# Patient Record
Sex: MALE | Race: WHITE | HISPANIC OR LATINO | Employment: UNEMPLOYED | URBAN - METROPOLITAN AREA
[De-identification: names, ages, dates, MRNs, and addresses within clinical notes are randomized per-mention and may not be internally consistent; named-entity substitution may affect disease eponyms.]

---

## 2017-05-23 ENCOUNTER — ALLSCRIPTS OFFICE VISIT (OUTPATIENT)
Dept: OTHER | Facility: OTHER | Age: 62
End: 2017-05-23

## 2017-10-03 ENCOUNTER — HOSPITAL ENCOUNTER (EMERGENCY)
Facility: HOSPITAL | Age: 62
Discharge: HOME | End: 2017-10-03
Attending: EMERGENCY MEDICINE
Payer: COMMERCIAL

## 2017-10-03 ENCOUNTER — APPOINTMENT (EMERGENCY)
Dept: RADIOLOGY | Facility: HOSPITAL | Age: 62
End: 2017-10-03
Payer: COMMERCIAL

## 2017-10-03 VITALS
SYSTOLIC BLOOD PRESSURE: 137 MMHG | OXYGEN SATURATION: 95 % | TEMPERATURE: 99.5 F | RESPIRATION RATE: 17 BRPM | DIASTOLIC BLOOD PRESSURE: 90 MMHG | HEART RATE: 74 BPM

## 2017-10-03 DIAGNOSIS — J32.9 SINUSITIS: Primary | ICD-10-CM

## 2017-10-03 DIAGNOSIS — R52 BODY ACHES: ICD-10-CM

## 2017-10-03 DIAGNOSIS — J45.901 ASTHMA EXACERBATION: ICD-10-CM

## 2017-10-03 LAB
ATRIAL RATE: 72 BPM
P AXIS: 71 DEGREES
PR INTERVAL: 150 MS
QRS AXIS: -5 DEGREES
QRSD INTERVAL: 78 MS
QT INTERVAL: 372 MS
QTC INTERVAL: 407 MS
T WAVE AXIS: 50 DEGREES
VENTRICULAR RATE: 72 BPM

## 2017-10-03 PROCEDURE — 96372 THER/PROPH/DIAG INJ SC/IM: CPT

## 2017-10-03 PROCEDURE — 94640 AIRWAY INHALATION TREATMENT: CPT

## 2017-10-03 PROCEDURE — 99284 EMERGENCY DEPT VISIT MOD MDM: CPT

## 2017-10-03 PROCEDURE — 93005 ELECTROCARDIOGRAM TRACING: CPT | Performed by: EMERGENCY MEDICINE

## 2017-10-03 PROCEDURE — 71020 HB CHEST X-RAY 2VW FRONTAL&LATL: CPT

## 2017-10-03 RX ORDER — PREDNISONE 20 MG/1
60 TABLET ORAL DAILY
Qty: 15 TABLET | Refills: 0 | Status: SHIPPED | OUTPATIENT
Start: 2017-10-03 | End: 2018-01-24 | Stop reason: ALTCHOICE

## 2017-10-03 RX ORDER — PREDNISONE 20 MG/1
60 TABLET ORAL ONCE
Status: COMPLETED | OUTPATIENT
Start: 2017-10-03 | End: 2017-10-03

## 2017-10-03 RX ORDER — OXYMETAZOLINE HYDROCHLORIDE 0.05 G/100ML
2 SPRAY NASAL 2 TIMES DAILY
Qty: 30 ML | Refills: 0 | Status: SHIPPED | OUTPATIENT
Start: 2017-10-03 | End: 2018-04-02

## 2017-10-03 RX ORDER — ALBUTEROL SULFATE 90 UG/1
2 AEROSOL, METERED RESPIRATORY (INHALATION) EVERY 4 HOURS PRN
Qty: 1 INHALER | Refills: 0 | Status: SHIPPED | OUTPATIENT
Start: 2017-10-03

## 2017-10-03 RX ORDER — KETOROLAC TROMETHAMINE 30 MG/ML
15 INJECTION, SOLUTION INTRAMUSCULAR; INTRAVENOUS ONCE
Status: COMPLETED | OUTPATIENT
Start: 2017-10-03 | End: 2017-10-03

## 2017-10-03 RX ADMIN — IPRATROPIUM BROMIDE 0.5 MG: 0.5 SOLUTION RESPIRATORY (INHALATION) at 12:33

## 2017-10-03 RX ADMIN — KETOROLAC TROMETHAMINE 15 MG: 30 INJECTION, SOLUTION INTRAMUSCULAR at 12:41

## 2017-10-03 RX ADMIN — PREDNISONE 60 MG: 20 TABLET ORAL at 12:36

## 2017-10-03 RX ADMIN — ALBUTEROL SULFATE 5 MG: 2.5 SOLUTION RESPIRATORY (INHALATION) at 12:33

## 2017-10-03 NOTE — DISCHARGE INSTRUCTIONS
Follow up with pcp in 1-3 days  If you have fevers, bloody cough, or if there are ANY concerns, return to the ED immediately  Asthma, Ambulatory Care   GENERAL INFORMATION:   Asthma  is a lung disease that makes breathing difficult  Chronic inflammation and reactions to triggers narrow the airways in your lungs  Asthma can become life-threatening if it is not managed  Common symptoms include the following:   · Coughing     · Wheezing     · Shortness of breath     · Chest tightness  Seek immediate care for the following symptoms:   · Severe shortness of breath    · Blue or gray lips or nails    · Skin around your neck and ribs pulls in with each breath    · Shortness of breath, even after you take your short-term medicine as directed     · Peak flow numbers in the red zone of your asthma action plan  Treatment for asthma  will depend on how severe it is  Medicine may decrease inflammation, open airways, and make it easier to breathe  Medicines may be inhaled, taken as a pill, or injected  Short-term medicines relieve your symptoms quickly  Long-term medicines are used to prevent future attacks  You may also need medicine to help control your allergies  Manage and prevent future asthma attacks:   · Follow your asthma action pan  This is a written plan that you and your healthcare provider create  It explains which medicine you need and when to change doses if necessary  It also explains how you can monitor symptoms and use a peak flow meter  The meter measures how well your lungs are working  · Manage other health conditions , such as allergies, acid reflux, and sleep apnea  · Identify and avoid triggers  These may include pets, dust mites, mold, and cockroaches  · Do not smoke and avoid others who smoke  If you smoke, it is never too late to quit  Ask your healthcare provider if you need help quitting  · Ask about a flu vaccine  The flu can make your asthma worse   You may need a yearly flu shot   Follow up with your healthcare provider as directed: You will need to return to make sure your medicine is working and your symptoms are controlled  You may be referred to an asthma or allergy specialist  Maria Fernanda Mendoza may be asked to keep a record of your peak flow values and bring it with you to your appointments  Write down your questions so you remember to ask them during your visits  CARE AGREEMENT:   You have the right to help plan your care  Learn about your health condition and how it may be treated  Discuss treatment options with your caregivers to decide what care you want to receive  You always have the right to refuse treatment  The above information is an  only  It is not intended as medical advice for individual conditions or treatments  Talk to your doctor, nurse or pharmacist before following any medical regimen to see if it is safe and effective for you  © 2014 [address] is for End User's use only and may not be sold, redistributed or otherwise used for commercial purposes  All illustrations and images included in CareNotes® are the copyrighted property of [employer]  or [employer]  Sinusitis   WHAT YOU NEED TO KNOW:   Sinusitis is inflammation or infection of your sinuses  It is most often caused by a virus  Acute sinusitis may last up to 12 weeks  Chronic sinusitis lasts longer than 12 weeks  Recurrent sinusitis means you have 4 or more times in 1 year  DISCHARGE INSTRUCTIONS:   Return to the emergency department if:   · Your eye and eyelid are red, swollen, and painful  · You cannot open your eye  · You have vision changes, such as double vision  · Your eyeball bulges out or you cannot move your eye  · You are more sleepy than normal, or you notice changes in your ability to think, move, or talk  · You have a stiff neck, a fever, or a bad headache  · You have swelling of your forehead or scalp    Contact your healthcare provider if:   · Your symptoms do not improve after 3 days  · Your symptoms do not go away after 10 days  · You have nausea and are vomiting  · Your nose is bleeding  · You have questions or concerns about your condition or care  Medicines: Your symptoms may go away on their own  Your healthcare provider may recommend watchful waiting for up to 10 days before starting antibiotics  You may  need any of the following:  · Acetaminophen  decreases pain and fever  It is available without a doctor's order  Ask how much to take and how often to take it  Follow directions  Read the labels of all other medicines you are using to see if they also contain acetaminophen, or ask your doctor or pharmacist  Acetaminophen can cause liver damage if not taken correctly  Do not use more than 4 grams (4,000 milligrams) total of acetaminophen in one day  · NSAIDs , such as ibuprofen, help decrease swelling, pain, and fever  This medicine is available with or without a doctor's order  NSAIDs can cause stomach bleeding or kidney problems in certain people  If you take blood thinner medicine, always ask your healthcare provider if NSAIDs are safe for you  Always read the medicine label and follow directions  · Nasal steroid sprays  may help decrease inflammation in your nose and sinuses  · Decongestants  help reduce swelling and drain mucus in the nose and sinuses  They may help you breathe easier  · Antihistamines  help dry mucus in the nose and relieve sneezing  · Antibiotics  help treat or prevent a bacterial infection  · Take your medicine as directed  Contact your healthcare provider if you think your medicine is not helping or if you have side effects  Tell him or her if you are allergic to any medicine  Keep a list of the medicines, vitamins, and herbs you take  Include the amounts, and when and why you take them  Bring the list or the pill bottles to follow-up visits   Carry your medicine list with you in case of an emergency  Self-care:   · Rinse your sinuses  Use a sinus rinse device to rinse your nasal passages with a saline (salt water) solution or distilled water  Do not use tap water  This will help thin the mucus in your nose and rinse away pollen and dirt  It will also help reduce swelling so you can breathe normally  Ask your healthcare provider how often to do this  · Breathe in steam   Heat a bowl of water until you see steam  Lean over the bowl and make a tent over your head with a large towel  Breathe deeply for about 20 minutes  Be careful not to get too close to the steam or burn yourself  Do this 3 times a day  You can also breathe deeply when you take a hot shower  · Sleep with your head elevated  Place an extra pillow under your head before you go to sleep to help your sinuses drain  · Drink liquids as directed  Ask your healthcare provider how much liquid to drink each day and which liquids are best for you  Liquids will thin the mucus in your nose and help it drain  Avoid drinks that contain alcohol or caffeine  · Do not smoke, and avoid secondhand smoke  Nicotine and other chemicals in cigarettes and cigars can make your symptoms worse  Ask your healthcare provider for information if you currently smoke and need help to quit  E-cigarettes or smokeless tobacco still contain nicotine  Talk to your healthcare provider before you use these products  Prevent the spread of germs that cause sinusitis:  Wash your hands often with soap and water  Wash your hands after you use the bathroom, change a child's diaper, or sneeze  Wash your hands before you prepare or eat food  Follow up with your healthcare provider as directed: You may be referred to an ear, nose, and throat specialist  Write down your questions so you remember to ask them during your visits     © 2017 [address] Information is for End User's use only and may not be sold, redistributed or otherwise used for commercial purposes  All illustrations and images included in CareNotes® are the copyrighted property of [employer]  or [address]  The above information is an  only  It is not intended as medical advice for individual conditions or treatments  Talk to your doctor, nurse or pharmacist before following any medical regimen to see if it is safe and effective for you

## 2017-10-03 NOTE — ED NOTES
ECG completed at 1232  Viewed and signed by Dr Terrance Eugene  Copy on chart         Denita Pierson  10/03/17 1311

## 2017-10-03 NOTE — ED PROVIDER NOTES
History  Chief Complaint   Patient presents with    URI     Pt states that when the weather changed last week he started with a cough and body aches  Pt states that his sinuses are "so bad "     78-year-old male history of asthma comes to emergency department for evaluation of shortness of breath  Patient states that for the past 5-7 days he has been having shortness of breath associated with body aches rhinorrhea and nonproductive cough  Patient states that this is likely due to the weather changes  Patient complains of exacerbation of his symptoms when he visited his aunt last Thursday who also had rhinorrhea and cough  Patient also states that he has nonradiating chest tightness that is worse when he coughs  Of note, patient states that the symptoms also got worse yesterday when he visited a sister who has cats to which he is allergic to  Patient states that he has been using his inhaler every to the cousin with mild relief of his symptoms  Patient denies history of PE or MRI  Otherwise, patient denies any fevers, nausea, vomiting, abdominal pain, dysuria, hematuria, constipation, diarrhea,  MDM: will give duonebs, prednisone ,cxr to evaluate for PNA and ibuprofen for body aches  I will reassess and dispo accordingly  None       Past Medical History:   Diagnosis Date    Asthma        Past Surgical History:   Procedure Laterality Date    HERNIA REPAIR         History reviewed  No pertinent family history  I have reviewed and agree with the history as documented  Social History   Substance Use Topics    Smoking status: Never Smoker    Smokeless tobacco: Never Used    Alcohol use Yes      Comment: occassionally        Review of Systems   Constitutional: Negative for appetite change, chills, diaphoresis, fatigue and fever  HENT: Negative for congestion, ear discharge, ear pain, hearing loss, postnasal drip, rhinorrhea, sneezing and sore throat      Eyes: Negative for pain, discharge and redness  Respiratory: Positive for cough, chest tightness, shortness of breath and wheezing  Negative for choking and stridor  Cardiovascular: Negative for chest pain and palpitations  Gastrointestinal: Negative for abdominal distention, abdominal pain, blood in stool, constipation, diarrhea, nausea and vomiting  Genitourinary: Negative for decreased urine volume, difficulty urinating, dysuria, flank pain, frequency and hematuria  Musculoskeletal: Negative for arthralgias, gait problem, joint swelling and neck pain  Skin: Negative for color change, pallor and rash  Allergic/Immunologic: Negative for environmental allergies, food allergies and immunocompromised state  Neurological: Negative for dizziness, seizures, weakness, light-headedness, numbness and headaches  Hematological: Negative for adenopathy  Does not bruise/bleed easily  Psychiatric/Behavioral: Negative for agitation and behavioral problems  Physical Exam  ED Triage Vitals [10/03/17 1203]   Temperature Pulse Respirations Blood Pressure SpO2   99 5 °F (37 5 °C) 91 20 155/99 95 %      Temp Source Heart Rate Source Patient Position - Orthostatic VS BP Location FiO2 (%)   Oral Monitor Sitting Right arm --      Pain Score       6           Physical Exam   Constitutional: He is oriented to person, place, and time  He appears well-developed and well-nourished  HENT:   Head: Normocephalic and atraumatic  Nose: Nose normal    Mouth/Throat: Oropharynx is clear and moist    Eyes: Conjunctivae and EOM are normal  Pupils are equal, round, and reactive to light  Neck: Normal range of motion  Neck supple  Cardiovascular: Normal rate, regular rhythm and normal heart sounds  Exam reveals no gallop and no friction rub  No murmur heard  Pulmonary/Chest: Effort normal  No respiratory distress  He has wheezes  He has no rales  Abdominal: Soft  Bowel sounds are normal  He exhibits no distension  There is no tenderness   There is no rebound and no guarding  Musculoskeletal: Normal range of motion  Neurological: He is alert and oriented to person, place, and time  Skin: Skin is warm and dry  Psychiatric: He has a normal mood and affect  His behavior is normal    Nursing note and vitals reviewed  ED Medications  Medications   albuterol inhalation solution 5 mg (5 mg Nebulization Given 10/3/17 1233)   ipratropium (ATROVENT) 0 02 % inhalation solution 0 5 mg (0 5 mg Nebulization Given 10/3/17 1233)   predniSONE tablet 60 mg (60 mg Oral Given 10/3/17 1236)   ketorolac (TORADOL) 30 mg/mL injection 15 mg (15 mg Intramuscular Given 10/3/17 1241)       Diagnostic Studies  Labs Reviewed - No data to display    XR chest 2 views   ED Interpretation   No sign of infiltrate, no  Pneumothorax  No acute pulmonary   pathology      Final Result      No active pulmonary disease  Workstation performed: [id number]             Procedures  Procedures      Phone Consults  ED Phone Contact    ED Course  ED Course as of Oct 03 2240   Tue Oct 03, 2017   1318 Patient is no longer wheezing  Patient no longer complains of chest tightness  Patient stated he feels much better            HEART Risk Score    Flowsheet Row Most Recent Value   History  0 Filed at: 10/03/2017 1219   ECG  No data   Age  1 Filed at: 10/03/2017 1219   Risk Factors  1 Filed at: 10/03/2017 1219   Troponin  No data   Heart Score Risk Calculator   History  0 Filed at: 10/03/2017 1219   ECG  No data   Age  1 Filed at: 10/03/2017 1219   Risk Factors  1 Filed at: 10/03/2017 1219   Troponin  No data   HEART Score  No data   HEART Score  No data                      Wells' Criteria for PE    Flowsheet Row Most Recent Value   Wells' Criteria for PE   Clinical signs and symptoms of DVT  0 Filed at: 10/03/2017 1219   PE is primary diagnosis or equally likely  0 Filed at: 10/03/2017 1219   HR >100  No data   Immobilization at least 3 days or Surgery in the previous 4 weeks  0 Filed at: 10/03/2017 1219   Previous, objectively diagnosed PE or DVT  0 Filed at: 10/03/2017 1219   Hemoptysis  0 Filed at: 10/03/2017 1219   Malignancy with treatment within 6 months or palliative  0 Filed at: 10/03/2017 1219   Wells' Criteria Total  0 Filed at: 10/03/2017 1219            [facility]  CritCare Time    Disposition  Final diagnoses:   Sinusitis   Asthma exacerbation   Body aches     ED Disposition     ED Disposition Condition Comment    Discharge  Alejo [place]  discharge to home/self care  Condition at discharge: Stable        Follow-up Information     Follow up With Specialties Details Why [address], DO Internal Medicine In 3 days  [address] [facility]  [phone]          Discharge Medication List as of 10/3/2017  1:28 PM      START taking these medications    Details   albuterol (PROVENTIL HFA,VENTOLIN HFA) 90 mcg/act inhaler Inhale 2 puffs every 4 (four) hours as needed for wheezing, Starting Tue 10/3/2017, Print      oxymetazoline (AFRIN) 0 05 % nasal spray 2 sprays by Each Nare route 2 (two) times a day, Starting Tue 10/3/2017, Print      predniSONE 20 mg tablet Take 3 tablets by mouth daily, Starting Tue 10/3/2017, Print           No discharge procedures on file  ED Provider  Attending physically available and evaluated [place] Sean PAREKH managed the patient along with the ED Attending      Electronically Signed by       Iman Miner MD  Resident  10/03/17 3042

## 2018-01-14 VITALS
BODY MASS INDEX: 35.35 KG/M2 | TEMPERATURE: 98 F | HEIGHT: 68 IN | WEIGHT: 233.25 LBS | SYSTOLIC BLOOD PRESSURE: 100 MMHG | DIASTOLIC BLOOD PRESSURE: 76 MMHG | HEART RATE: 76 BPM

## 2018-01-24 ENCOUNTER — OFFICE VISIT (OUTPATIENT)
Dept: INTERNAL MEDICINE CLINIC | Facility: CLINIC | Age: 63
End: 2018-01-24
Payer: COMMERCIAL

## 2018-01-24 VITALS
TEMPERATURE: 97.4 F | HEIGHT: 68 IN | DIASTOLIC BLOOD PRESSURE: 100 MMHG | HEART RATE: 62 BPM | BODY MASS INDEX: 36.49 KG/M2 | WEIGHT: 240.74 LBS | SYSTOLIC BLOOD PRESSURE: 150 MMHG

## 2018-01-24 DIAGNOSIS — Z00.00 HEALTHCARE MAINTENANCE: ICD-10-CM

## 2018-01-24 DIAGNOSIS — R53.83 FATIGUE: ICD-10-CM

## 2018-01-24 DIAGNOSIS — R73.01 IMPAIRED FASTING GLUCOSE: ICD-10-CM

## 2018-01-24 DIAGNOSIS — K40.90 RIGHT INGUINAL HERNIA: ICD-10-CM

## 2018-01-24 DIAGNOSIS — Z23 NEED FOR INFLUENZA VACCINATION: Primary | ICD-10-CM

## 2018-01-24 PROBLEM — E66.9 OBESITY: Status: ACTIVE | Noted: 2017-05-23

## 2018-01-24 PROBLEM — F32.A DEPRESSION: Status: ACTIVE | Noted: 2017-05-23

## 2018-01-24 PROCEDURE — 90688 IIV4 VACCINE SPLT 0.5 ML IM: CPT | Performed by: INTERNAL MEDICINE

## 2018-01-24 PROCEDURE — 99213 OFFICE O/P EST LOW 20 MIN: CPT | Performed by: INTERNAL MEDICINE

## 2018-01-24 PROCEDURE — 90471 IMMUNIZATION ADMIN: CPT | Performed by: INTERNAL MEDICINE

## 2018-01-24 RX ORDER — TRAZODONE HYDROCHLORIDE 50 MG/1
50 TABLET ORAL
Qty: 30 TABLET | Refills: 1 | Status: SHIPPED | OUTPATIENT
Start: 2018-01-24 | End: 2018-04-02

## 2018-01-24 RX ORDER — IBUPROFEN 400 MG/1
500 TABLET ORAL DAILY PRN
COMMUNITY
Start: 2015-07-06 | End: 2018-04-03 | Stop reason: HOSPADM

## 2018-01-24 NOTE — PROGRESS NOTES
Assessment/Plan:    1  Fatigue  · Likely secondary to depression, however will rule out anemia or hormonal abnormalities  · Will obtain CBC, BMP, Vitamin B12, Testosterone, TSH with T4 reflex  · Start Trazodone  Patient never filled his prescription of Sertraline  2  Health Maintenance  · Last Hb1AC 5 9% was 9/2014  Will obtain new Hb1AC  · Influenza Vaccine administered today  3  Right Inguinal Hernia  · S/p Repair  Patient states that he feels the mesh has fallen out of place  · Referral for General Surgery    4  BPH  · Continue Flomax  Problem List Items Addressed This Visit        Endocrine    Impaired fasting glucose    Relevant Orders    Hemoglobin A1c       Other    Fatigue    Relevant Medications    traZODone (DESYREL) 50 mg tablet    Other Relevant Orders    CBC and differential    Basic metabolic panel    Vitamin B12    TSH, 3rd generation with T4 reflex    Testosterone, Free/Tot Equilib    Healthcare maintenance    Relevant Orders    Basic metabolic panel    Need for influenza vaccination - Primary    Relevant Orders    Flu vaccine greater than or equal to 4yo preservative free IM      Other Visit Diagnoses     Right inguinal hernia        Relevant Orders    Ambulatory referral to General Surgery            Subjective:      Patient ID: Tonya Carlton  is a 60 y o  male  60year old male with pmhx of BPH, impaired fasting glucose, depression presented for 6 month follow up visit  Patient presents with fatigue and problems sleeping  Patient says he has problems sleeping at night, however he sits and watches TV until he falls asleep and drinks caffeinated tea before going to sleep   Patient has been attempting to exercise weekly with dietary modifications, however he has been gaining weight  He has gained approximately 4-6 pounds in 1 month  Patient also knows he's been having polyphagia and excessive hunger, but denies polyuria or polydipsia   Patient has decreased energy level however denies any feelings of guilt or impaired concentration  The following portions of the patient's history were reviewed and updated as appropriate:   He  has a past medical history of Asthma and Need for prophylactic vaccination and inoculation against influenza  He  does not have any pertinent problems on file  He  has a past surgical history that includes Hernia repair  His family history includes Breast cancer in his maternal aunt; No Known Problems in his family  He  reports that he has never smoked  He has never used smokeless tobacco  He reports that he drinks alcohol  He reports that he does not use drugs  Current Outpatient Prescriptions   Medication Sig Dispense Refill    albuterol (PROVENTIL HFA,VENTOLIN HFA) 90 mcg/act inhaler Inhale 2 puffs every 4 (four) hours as needed for wheezing 1 Inhaler 0    ibuprofen (MOTRIN) 400 mg tablet Take 1 tablet by mouth every 8 (eight) hours      oxymetazoline (AFRIN) 0 05 % nasal spray 2 sprays by Each Nare route 2 (two) times a day 30 mL 0    traZODone (DESYREL) 50 mg tablet Take 1 tablet by mouth daily at bedtime 30 tablet 1     No current facility-administered medications for this visit  Current Outpatient Prescriptions on File Prior to Visit   Medication Sig    albuterol (PROVENTIL HFA,VENTOLIN HFA) 90 mcg/act inhaler Inhale 2 puffs every 4 (four) hours as needed for wheezing    oxymetazoline (AFRIN) 0 05 % nasal spray 2 sprays by Each Nare route 2 (two) times a day    [DISCONTINUED] predniSONE 20 mg tablet Take 3 tablets by mouth daily     No current facility-administered medications on file prior to visit  He has No Known Allergies       Review of Systems   Constitutional: Positive for appetite change and fatigue  Negative for activity change, chills and fever  Eyes: Negative for itching and visual disturbance  Respiratory: Negative for cough, chest tightness and shortness of breath      Cardiovascular: Negative for chest pain and leg swelling  Gastrointestinal: Negative for abdominal pain, constipation, diarrhea, nausea and vomiting  Endocrine: Positive for polyphagia  Negative for cold intolerance and heat intolerance  Genitourinary: Positive for difficulty urinating  Negative for dysuria  Musculoskeletal: Negative for gait problem  Skin: Negative for rash  Allergic/Immunologic: Negative  Neurological: Negative for dizziness, weakness, light-headedness and headaches  Hematological: Negative  Psychiatric/Behavioral: Positive for sleep disturbance  Negative for agitation, decreased concentration and dysphoric mood  All other systems reviewed and are negative  Objective:  Vitals:    01/24/18 0857   BP: 150/100   Pulse: 62   Temp: (!) 97 4 °F (36 3 °C)        Physical Exam   Constitutional: He is oriented to person, place, and time  He appears well-developed and well-nourished  No distress  HENT:   Head: Normocephalic and atraumatic  Mouth/Throat: Oropharynx is clear and moist    Eyes: Conjunctivae are normal  Pupils are equal, round, and reactive to light  Neck: Normal range of motion  Neck supple  Cardiovascular: Normal rate, regular rhythm, normal heart sounds and intact distal pulses  Pulmonary/Chest: Effort normal and breath sounds normal  No respiratory distress  He has no wheezes  Abdominal: Soft  Bowel sounds are normal  He exhibits no distension  There is no tenderness  Obese abdomen   Musculoskeletal: He exhibits no edema  Lymphadenopathy:     He has no cervical adenopathy  Neurological: He is alert and oriented to person, place, and time  Skin: Skin is warm and dry  Psychiatric: He has a normal mood and affect  CHUCKY Rich    Internal Medicine PGY-1  [facility]  1/24/2018 11:59 AM

## 2018-01-24 NOTE — PATIENT INSTRUCTIONS
Please get lab test performed  Testosterone lab work needs to be drawn early in the morning between 8am-10am    Remember importance of sleep hygiene  Make an appointment with General Surgery for inguinal mesh repair  Follow up in 2-3 months or before if needed   your new medication Trazodone at your pharmacy

## 2018-01-25 ENCOUNTER — APPOINTMENT (OUTPATIENT)
Dept: LAB | Facility: CLINIC | Age: 63
End: 2018-01-25
Payer: COMMERCIAL

## 2018-01-25 DIAGNOSIS — R53.83 FATIGUE: ICD-10-CM

## 2018-01-25 DIAGNOSIS — Z00.00 HEALTHCARE MAINTENANCE: ICD-10-CM

## 2018-01-25 DIAGNOSIS — R73.01 IMPAIRED FASTING GLUCOSE: ICD-10-CM

## 2018-01-25 DIAGNOSIS — R79.89 LOW TESTOSTERONE LEVEL IN MALE: Primary | ICD-10-CM

## 2018-01-25 LAB
ANION GAP SERPL CALCULATED.3IONS-SCNC: 6 MMOL/L (ref 4–13)
BASOPHILS # BLD AUTO: 0.03 THOUSANDS/ΜL (ref 0–0.1)
BASOPHILS NFR BLD AUTO: 0 % (ref 0–1)
BUN SERPL-MCNC: 18 MG/DL (ref 5–25)
CALCIUM SERPL-MCNC: 8.6 MG/DL (ref 8.3–10.1)
CHLORIDE SERPL-SCNC: 109 MMOL/L (ref 100–108)
CO2 SERPL-SCNC: 27 MMOL/L (ref 21–32)
CREAT SERPL-MCNC: 1.1 MG/DL (ref 0.6–1.3)
EOSINOPHIL # BLD AUTO: 0.3 THOUSAND/ΜL (ref 0–0.61)
EOSINOPHIL NFR BLD AUTO: 4 % (ref 0–6)
ERYTHROCYTE [DISTWIDTH] IN BLOOD BY AUTOMATED COUNT: 14.3 % (ref 11.6–15.1)
EST. AVERAGE GLUCOSE BLD GHB EST-MCNC: 123 MG/DL
GFR SERPL CREATININE-BSD FRML MDRD: 71 ML/MIN/1.73SQ M
GLUCOSE P FAST SERPL-MCNC: 103 MG/DL (ref 65–99)
HBA1C MFR BLD: 5.9 % (ref 4.2–6.3)
HCT VFR BLD AUTO: 43.9 % (ref 36.5–49.3)
HGB BLD-MCNC: 14.8 G/DL (ref 12–17)
LYMPHOCYTES # BLD AUTO: 2.32 THOUSANDS/ΜL (ref 0.6–4.47)
LYMPHOCYTES NFR BLD AUTO: 28 % (ref 14–44)
MCH RBC QN AUTO: 31.6 PG (ref 26.8–34.3)
MCHC RBC AUTO-ENTMCNC: 33.7 G/DL (ref 31.4–37.4)
MCV RBC AUTO: 94 FL (ref 82–98)
MONOCYTES # BLD AUTO: 0.87 THOUSAND/ΜL (ref 0.17–1.22)
MONOCYTES NFR BLD AUTO: 10 % (ref 4–12)
NEUTROPHILS # BLD AUTO: 4.83 THOUSANDS/ΜL (ref 1.85–7.62)
NEUTS SEG NFR BLD AUTO: 58 % (ref 43–75)
NRBC BLD AUTO-RTO: 0 /100 WBCS
PLATELET # BLD AUTO: 312 THOUSANDS/UL (ref 149–390)
PMV BLD AUTO: 10.8 FL (ref 8.9–12.7)
POTASSIUM SERPL-SCNC: 3.8 MMOL/L (ref 3.5–5.3)
RBC # BLD AUTO: 4.68 MILLION/UL (ref 3.88–5.62)
SODIUM SERPL-SCNC: 142 MMOL/L (ref 136–145)
TSH SERPL DL<=0.05 MIU/L-ACNC: 2.69 UIU/ML (ref 0.36–3.74)
VIT B12 SERPL-MCNC: 350 PG/ML (ref 100–900)
WBC # BLD AUTO: 8.39 THOUSAND/UL (ref 4.31–10.16)

## 2018-01-25 PROCEDURE — 83036 HEMOGLOBIN GLYCOSYLATED A1C: CPT

## 2018-01-25 PROCEDURE — 84443 ASSAY THYROID STIM HORMONE: CPT

## 2018-01-25 PROCEDURE — 36415 COLL VENOUS BLD VENIPUNCTURE: CPT

## 2018-01-25 PROCEDURE — 82607 VITAMIN B-12: CPT

## 2018-01-25 PROCEDURE — 84403 ASSAY OF TOTAL TESTOSTERONE: CPT

## 2018-01-25 PROCEDURE — 80048 BASIC METABOLIC PNL TOTAL CA: CPT

## 2018-01-25 PROCEDURE — 84402 ASSAY OF FREE TESTOSTERONE: CPT

## 2018-01-25 PROCEDURE — 85025 COMPLETE CBC W/AUTO DIFF WBC: CPT

## 2018-01-27 LAB
TESTOST FREE SERPL-MCNC: 9.8 PG/ML (ref 6.6–18.1)
TESTOST SERPL-MCNC: 141 NG/DL (ref 264–916)

## 2018-02-13 ENCOUNTER — APPOINTMENT (OUTPATIENT)
Dept: LAB | Facility: CLINIC | Age: 63
End: 2018-02-13
Payer: COMMERCIAL

## 2018-02-13 DIAGNOSIS — R53.83 FATIGUE: ICD-10-CM

## 2018-02-13 DIAGNOSIS — R79.89 LOW TESTOSTERONE LEVEL IN MALE: ICD-10-CM

## 2018-02-13 LAB
FSH SERPL-ACNC: 3 MIU/ML (ref 0.7–10.8)
LH SERPL-ACNC: 3.2 MIU/ML (ref 1.2–10.6)
PROLACTIN SERPL-MCNC: 16.7 NG/ML (ref 2.5–17.4)

## 2018-02-13 PROCEDURE — 83001 ASSAY OF GONADOTROPIN (FSH): CPT

## 2018-02-13 PROCEDURE — 84403 ASSAY OF TOTAL TESTOSTERONE: CPT

## 2018-02-13 PROCEDURE — 83918 ORGANIC ACIDS TOTAL QUANT: CPT

## 2018-02-13 PROCEDURE — 84402 ASSAY OF FREE TESTOSTERONE: CPT

## 2018-02-13 PROCEDURE — 83002 ASSAY OF GONADOTROPIN (LH): CPT

## 2018-02-13 PROCEDURE — 36415 COLL VENOUS BLD VENIPUNCTURE: CPT

## 2018-02-13 PROCEDURE — 84146 ASSAY OF PROLACTIN: CPT

## 2018-02-14 LAB
TESTOST FREE SERPL-MCNC: 10.3 PG/ML (ref 6.6–18.1)
TESTOST SERPL-MCNC: 171 NG/DL (ref 264–916)

## 2018-02-16 DIAGNOSIS — R79.89 LOW TESTOSTERONE LEVEL IN MALE: ICD-10-CM

## 2018-02-16 LAB — METHYLMALONATE SERPL-SCNC: 179 NMOL/L (ref 0–378)

## 2018-02-16 RX ORDER — TESTOSTERONE 12.5 MG/1.25G
50 GEL TOPICAL EVERY MORNING
Qty: 1 BOTTLE | Refills: 0 | Status: SHIPPED | OUTPATIENT
Start: 2018-02-16 | End: 2018-04-02

## 2018-02-16 RX ORDER — TESTOSTERONE 12.5 MG/1.25G
50 GEL TOPICAL EVERY MORNING
Qty: 1 BOTTLE | Refills: 0 | Status: SHIPPED | OUTPATIENT
Start: 2018-02-16 | End: 2018-02-16 | Stop reason: SDUPTHER

## 2018-02-16 RX ORDER — TESTOSTERONE 12.5 MG/1.25G
50 GEL TOPICAL EVERY MORNING
Qty: 1 BOTTLE | Refills: 1 | OUTPATIENT
Start: 2018-02-16 | End: 2018-02-16 | Stop reason: SDUPTHER

## 2018-02-16 NOTE — TELEPHONE ENCOUNTER
SCRIPT PRINTED AND ATTEMPTED TO CONTACT Pt   NO ANSWER BUT WE WILL CONTINUE TO TRY AS WE NEED TO GIVE HIM LAB RESULTS AS WELL

## 2018-02-26 ENCOUNTER — OFFICE VISIT (OUTPATIENT)
Dept: MULTI SPECIALTY CLINIC | Facility: CLINIC | Age: 63
End: 2018-02-26
Payer: COMMERCIAL

## 2018-02-26 VITALS
WEIGHT: 241.18 LBS | BODY MASS INDEX: 36.55 KG/M2 | SYSTOLIC BLOOD PRESSURE: 138 MMHG | TEMPERATURE: 98.1 F | HEIGHT: 68 IN | HEART RATE: 68 BPM | DIASTOLIC BLOOD PRESSURE: 82 MMHG

## 2018-02-26 DIAGNOSIS — K40.90 RIGHT INGUINAL HERNIA: ICD-10-CM

## 2018-02-26 PROCEDURE — 99202 OFFICE O/P NEW SF 15 MIN: CPT | Performed by: SURGERY

## 2018-02-26 NOTE — ASSESSMENT & PLAN NOTE
No hernia palpated on physical exam  Will get ultrasound to reassess for hernia recurrence  If no hernia seen on ultrasound, will assess with CT scan  Follow up with us in the office after ultrasound

## 2018-02-26 NOTE — PROGRESS NOTES
Office Visit - General Surgery  Alejoabby Torres  MRN: [id number]  Encounter: [id number]    Assessment and Plan    Problem List Items Addressed This Visit        Other    Right inguinal hernia     No hernia palpated on physical exam  Will get ultrasound to reassess for hernia recurrence  If no hernia seen on ultrasound, will assess with CT scan  Follow up with us in the office after ultrasound  Relevant Orders    US groin/inguinal area          Chief Complaint:  Gypsy Palacios  is a 60 y o  male who presents for Inguinal Hernia (Right)    Subjective  62M who presents with what he thinks is a recurrence of his right inguinal hernia  He states that he had the repair done here at [place] [address] as an open repair with mesh approximately 5-10 years ago  He had an ultrasound performed in 2014 which was negative for any hernias  He states that is does not cause him very much pain, but it does bulge approximately 3 times a week when he is very relaxed or after he has just exercised  He says it feels like the hernia before it was repaired  He does not feel a bulge in his scrotum  He says it has mostly been bothering him when he urinates  He has difficulty starting his urinary stream and feels as though he needs to press on the right inguinal area in order to urinate  He does have a history of BPH for which he takes flomax  He does not have any changes in his bowel habits and has been moving his bowels appropriately  He has been tolerating a diet with no issues  He denies abdominal pain       Past Medical History  Past Medical History:   Diagnosis Date    Asthma     Need for prophylactic vaccination and inoculation against influenza        Past Surgical History  Past Surgical History:   Procedure Laterality Date    HERNIA REPAIR         Family History  Family History   Problem Relation Age of Onset    No Known Problems Family     Breast cancer Maternal Aunt        Medications  Current Outpatient Prescriptions on File Prior to Visit   Medication Sig Dispense Refill    albuterol (PROVENTIL HFA,VENTOLIN HFA) 90 mcg/act inhaler Inhale 2 puffs every 4 (four) hours as needed for wheezing 1 Inhaler 0    ibuprofen (MOTRIN) 400 mg tablet Take 1 tablet by mouth every 8 (eight) hours      oxymetazoline (AFRIN) 0 05 % nasal spray 2 sprays by Each Nare route 2 (two) times a day 30 mL 0    Testosterone 12 5 MG/ACT (1%) GEL Place 50 mg on the skin every morning Apply to shoulders, upper arms or abdomen (not to genitals) each morning  1 Bottle 0    traZODone (DESYREL) 50 mg tablet Take 1 tablet by mouth daily at bedtime 30 tablet 1     No current facility-administered medications on file prior to visit  Allergies  No Known Allergies    Review of Systems   Constitutional: Negative  HENT: Negative  Eyes: Negative  Respiratory: Negative  Cardiovascular: Negative  Gastrointestinal: Negative  Negative for abdominal pain, blood in stool, constipation, diarrhea, nausea and vomiting  Endocrine: Negative  Genitourinary: Positive for difficulty urinating  Groin swelling/bulge   Musculoskeletal: Negative  Skin: Negative  Allergic/Immunologic: Negative  Neurological: Negative  Hematological: Negative  Psychiatric/Behavioral: Negative  Objective  Vitals:    02/26/18 1004   BP: 138/82   Pulse: 68   Temp: 98 1 °F (36 7 °C)       Physical Exam   Constitutional: He is oriented to person, place, and time  He appears well-developed and well-nourished  No distress  HENT:   Head: Normocephalic and atraumatic  Eyes: EOM are normal  Pupils are equal, round, and reactive to light  No scleral icterus  Neck: No JVD present  Cardiovascular: Normal rate, regular rhythm and normal heart sounds  Exam reveals no gallop and no friction rub  No murmur heard  Pulmonary/Chest: Effort normal and breath sounds normal  No stridor  No respiratory distress  Abdominal: Soft  He exhibits no distension  There is no tenderness  There is no rebound and no guarding  Genitourinary:   Genitourinary Comments: No inguinal hernia on right noted during exam even with valsalva  Musculoskeletal: He exhibits no edema  Neurological: He is alert and oriented to person, place, and time  Skin: Skin is warm and dry  He is not diaphoretic  Psychiatric: He has a normal mood and affect

## 2018-03-01 ENCOUNTER — TRANSCRIBE ORDERS (OUTPATIENT)
Dept: RADIOLOGY | Facility: HOSPITAL | Age: 63
End: 2018-03-01

## 2018-03-01 ENCOUNTER — HOSPITAL ENCOUNTER (OUTPATIENT)
Dept: RADIOLOGY | Facility: HOSPITAL | Age: 63
Discharge: HOME | End: 2018-03-01
Payer: COMMERCIAL

## 2018-03-01 DIAGNOSIS — K40.90 RIGHT INGUINAL HERNIA: ICD-10-CM

## 2018-03-01 PROCEDURE — 76705 ECHO EXAM OF ABDOMEN: CPT

## 2018-03-26 ENCOUNTER — OFFICE VISIT (OUTPATIENT)
Dept: MULTI SPECIALTY CLINIC | Facility: CLINIC | Age: 63
End: 2018-03-26
Payer: COMMERCIAL

## 2018-03-26 VITALS
TEMPERATURE: 97.2 F | DIASTOLIC BLOOD PRESSURE: 90 MMHG | SYSTOLIC BLOOD PRESSURE: 144 MMHG | HEART RATE: 72 BPM | BODY MASS INDEX: 36.09 KG/M2 | WEIGHT: 238.1 LBS | HEIGHT: 68 IN

## 2018-03-26 DIAGNOSIS — K40.90 RIGHT INGUINAL HERNIA: Primary | ICD-10-CM

## 2018-03-26 PROCEDURE — 99213 OFFICE O/P EST LOW 20 MIN: CPT | Performed by: SURGERY

## 2018-03-26 NOTE — PROGRESS NOTES
Office Visit - General Surgery  Alejo Delarosa  MRN: [id number]  Encounter: [id number]    Assessment and Plan    Problem List Items Addressed This Visit        Other    Right inguinal hernia - Primary     Large right inguinal hernia w/ contents extending to scrotum, reducible on exam    I have recommended open repair; patient with previous repair on LEFT side, there is no evidence of recurrence at his previous repair site  Discussed risks, benefits of repair  Patient is agreeable to above  Will obtain CXR and EKG for history of asthma  We will schedule surgery at earliest mutual convenience               Chief Complaint:  Shilpa Isidro  is a 60 y o  male who presents for Follow-up (Ultrasound results)    Subjective  60 y o  M w/ symptomatic R inguinal hernia  Patient has previous L sided open repair  He denies obstructive symptoms, however occasionally has issues urinating  He has had previous left sided repair  He presents for evaluation       U/S confirms right sided inguinal hernia containing bladder    Past Medical History  Past Medical History:   Diagnosis Date    Asthma     Need for prophylactic vaccination and inoculation against influenza        Past Surgical History  Past Surgical History:   Procedure Laterality Date    HERNIA REPAIR         Family History  Family History   Problem Relation Age of Onset    No Known Problems Family     Breast cancer Maternal Aunt        Medications  Current Outpatient Prescriptions on File Prior to Visit   Medication Sig Dispense Refill    albuterol (PROVENTIL HFA,VENTOLIN HFA) 90 mcg/act inhaler Inhale 2 puffs every 4 (four) hours as needed for wheezing 1 Inhaler 0    ibuprofen (MOTRIN) 400 mg tablet Take 1 tablet by mouth every 8 (eight) hours      oxymetazoline (AFRIN) 0 05 % nasal spray 2 sprays by Each Nare route 2 (two) times a day 30 mL 0    Testosterone 12 5 MG/ACT (1%) GEL Place 50 mg on the skin every morning Apply to shoulders, upper arms or abdomen (not to genitals) each morning  1 Bottle 0    traZODone (DESYREL) 50 mg tablet Take 1 tablet by mouth daily at bedtime 30 tablet 1     No current facility-administered medications on file prior to visit  Allergies  No Known Allergies    Review of Systems   Constitutional: Negative  HENT: Negative  Eyes: Negative  Respiratory: Negative  Cardiovascular: Negative  Gastrointestinal: Negative for abdominal distention, nausea and vomiting  Endocrine: Negative  Genitourinary: Positive for difficulty urinating, scrotal swelling and testicular pain  Musculoskeletal: Negative  Skin: Negative  Allergic/Immunologic: Negative  Neurological: Negative  Hematological: Negative  Psychiatric/Behavioral: Negative  Objective  Vitals:    03/26/18 0935   BP: 144/90   Pulse: 72   Temp: (!) 97 2 °F (36 2 °C)       Physical Exam   Constitutional: He appears well-developed  No distress  Eyes: Pupils are equal, round, and reactive to light  Neck: Normal range of motion  Cardiovascular: Normal rate and regular rhythm  Pulmonary/Chest: Effort normal and breath sounds normal  No respiratory distress  Abdominal: Soft  He exhibits no distension  There is no tenderness     Right inguinal hernia, reducible when supine with hernia contents extending to right scrotum, no palapble mass on left side at site of previous open repair, incision is healed

## 2018-03-26 NOTE — ASSESSMENT & PLAN NOTE
Large right inguinal hernia w/ contents extending to scrotum, reducible on exam    I have recommended open repair; patient with previous repair on LEFT side, there is no evidence of recurrence at his previous repair site  Discussed risks, benefits of repair  Patient is agreeable to above  Will obtain CXR and EKG for history of asthma   We will schedule surgery at earliest mutual convenience

## 2018-03-26 NOTE — PATIENT INSTRUCTIONS
Inguinal Hernia   WHAT YOU NEED TO KNOW:   What is an inguinal hernia? An inguinal hernia happens when organs or abdominal tissue push through a weak spot in the abdominal wall  The abdominal wall is made of fat and muscle  It holds the intestines in place  The hernia may contain fluid, tissue from the abdomen, or part of an organ (such as an intestine)  What causes an inguinal hernia? The cause of your hernia may not be known  You may have been born with a weak spot or opening in the abdominal wall  The area may have become weak from surgery or an injury  You may get a hernia after you lift something heavy or strain during a bowel movement  Your risk for a hernia may be increased if you smoke or you are overweight  Inguinal hernias are more common in males  A family history of hernias increases your risk for an inguinal hernia  What are the signs and symptoms of an inguinal hernia? A hernia may happen over time or it may happen suddenly  Some movements can make symptoms worse  Examples include when you cough, sneeze, strain to have a bowel movement, lift, or stand for a long time  You may have any of the following:  · A soft lump or bulge in your groin, lower abdomen, or scrotum     · Pain or burning in your abdomen  How is an inguinal hernia diagnosed? Your healthcare provider may ask you to bend or cough to see if he can feel your hernia  You may need blood or urine tests to check your kidney function or find signs of infection  X-ray, MRI, CT scan, or ultrasound pictures may show blockage in the intestines or lack of blood flow to organs  You may be given contrast liquid to help the organs show up better in the pictures  Tell the healthcare provider if you have ever had an allergic reaction to contrast liquid  Do not enter the MRI room with anything metal  Metal can cause serious injury  Tell the healthcare provider if you have any metal in or on your body  How is an inguinal hernia treated?    · A manual reduction of the hernia  may be needed  Manual reduction means your healthcare provider will use his hands to put firm, steady pressure on your hernia  He will continue until the hernia disappears inside the abdominal wall  · Surgery  may be needed if the hernia stops blood flow to any of the organs  Surgery may also be needed if the hernia causes a hole in the intestines, or blocks the intestines  How can I manage my symptoms and prevent another hernia? · Do not lift anything heavy  Heavy lifting can make your hernia worse or cause another hernia  Ask your healthcare provider how much is safe for you to lift  · Drink liquids as directed  Liquids may prevent constipation and straining during a bowel movement  Ask how much liquid to drink each day and which liquids are best for you  · Eat foods high in fiber  Fiber may prevent constipation and straining during a bowel movement  Foods that contain fiber include fruits, vegetables, beans, lentils, and whole grains  · Maintain a healthy weight  If you are overweight, weight loss may prevent your hernia from getting worse  It may also prevent another hernia  Talk to your healthcare provider about exercise and how to lose weight safely if you are overweight  · Do not smoke  Nicotine and other chemicals in cigarettes and cigars can weaken the abdominal wall  This may increase your risk for another hernia  Ask your healthcare provider for information if you currently smoke and need help to quit  E-cigarettes or smokeless tobacco still contain nicotine  Talk to your healthcare provider before you use these products  · Take NSAIDs as directed  NSAIDs, such as ibuprofen, help decrease swelling, pain, and fever  NSAIDs can cause stomach bleeding or kidney problems in certain people  If you take blood thinner medicine, always ask your healthcare provider if NSAIDs are safe for you   Always read the medicine label and follow directions  When should I seek immediate care? · You have severe abdominal pain with nausea and vomiting  · Your abdomen is larger than usual      · Your hernia gets bigger or is purple or blue  · You see blood in your bowel movements  · You feel weak, dizzy, or faint  When should I contact my healthcare provider? · You have a fever  · You have questions or concerns about your condition or care  CARE AGREEMENT:   You have the right to help plan your care  Learn about your health condition and how it may be treated  Discuss treatment options with your caregivers to decide what care you want to receive  You always have the right to refuse treatment  The above information is an  only  It is not intended as medical advice for individual conditions or treatments  Talk to your doctor, nurse or pharmacist before following any medical regimen to see if it is safe and effective for you  © 2017 [employer] Information is for End User's use only and may not be sold, redistributed or otherwise used for commercial purposes  All illustrations and images included in CareNotes® are the copyrighted property of [employer]  or [employer]

## 2018-03-28 ENCOUNTER — TELEPHONE (OUTPATIENT)
Dept: INTERNAL MEDICINE CLINIC | Facility: CLINIC | Age: 63
End: 2018-03-28

## 2018-03-28 NOTE — TELEPHONE ENCOUNTER
Tried calling pt to provide surgery date, but number on file [phone] kept ringing and did not have a voicemail  Will try again at a later time

## 2018-03-29 ENCOUNTER — TELEPHONE (OUTPATIENT)
Dept: INTERNAL MEDICINE CLINIC | Facility: CLINIC | Age: 63
End: 2018-03-29

## 2018-03-29 NOTE — TELEPHONE ENCOUNTER
Spoke to pt and confirmed surgery date 4/3/2018 with Dr Law Yee for Right Inguinal Hernia Repair at [facility] [place]  Pt is aware the hospital will call the day before surgery to provide time  Pt is aware a chest Xray and EKG are required before surgery  Pt advised he will go tomorrow to the hospital as an outpatient to have testing completed

## 2018-03-29 NOTE — TELEPHONE ENCOUNTER
Pt is scheduled for surgery on 4/3/2018 with Dr Peg Oconnell for Right Inguinal Hernia Repair at [facility]  P code: [id number], D code: [id number] [phone]

## 2018-03-30 ENCOUNTER — OFFICE VISIT (OUTPATIENT)
Dept: LAB | Facility: HOSPITAL | Age: 63
End: 2018-03-30
Attending: SURGERY
Payer: COMMERCIAL

## 2018-03-30 ENCOUNTER — TRANSCRIBE ORDERS (OUTPATIENT)
Dept: LAB | Facility: HOSPITAL | Age: 63
End: 2018-03-30

## 2018-03-30 ENCOUNTER — HOSPITAL ENCOUNTER (OUTPATIENT)
Dept: RADIOLOGY | Facility: HOSPITAL | Age: 63
Discharge: HOME | End: 2018-03-30
Attending: SURGERY
Payer: COMMERCIAL

## 2018-03-30 DIAGNOSIS — K40.90 RIGHT INGUINAL HERNIA: ICD-10-CM

## 2018-03-30 LAB
ATRIAL RATE: 65 BPM
P AXIS: 38 DEGREES
PR INTERVAL: 158 MS
QRS AXIS: -38 DEGREES
QRSD INTERVAL: 82 MS
QT INTERVAL: 410 MS
QTC INTERVAL: 426 MS
T WAVE AXIS: 14 DEGREES
VENTRICULAR RATE: 65 BPM

## 2018-03-30 PROCEDURE — 71046 X-RAY EXAM CHEST 2 VIEWS: CPT

## 2018-03-30 PROCEDURE — 93005 ELECTROCARDIOGRAM TRACING: CPT

## 2018-04-02 ENCOUNTER — ANESTHESIA EVENT (OUTPATIENT)
Dept: PERIOP | Facility: HOSPITAL | Age: 63
End: 2018-04-02
Payer: COMMERCIAL

## 2018-04-02 NOTE — ANESTHESIA PREPROCEDURE EVALUATION
Review of Systems/Medical History  Patient summary reviewed  Chart reviewed  No history of anesthetic complications     Cardiovascular  Negative cardio ROS    Pulmonary  Asthma: PRN med  controlled Asthma type of rescue: PRN inhaler,        GI/Hepatic  Negative GI/hepatic ROS     Comment: RIH     Negative  ROS Prostatic disorder, benign prostatic hyperplasia       Endo/Other    Obesity (BMI 36)  morbid obesity   GYN       Hematology  Negative hematology ROS      Musculoskeletal    Comment: Right knee pain      Neurology  Negative neurology ROS      Psychology   Depression ,              Physical Exam    Airway    Mallampati score: I  TM Distance: >3 FB  Neck ROM: full     Dental   No notable dental hx     Cardiovascular  Comment: Negative ROS, Rhythm: regular,     Pulmonary  Breath sounds clear to auscultation,     Other Findings        Anesthesia Plan  ASA Score- 2     Anesthesia Type- general with ASA Monitors  Additional Monitors:   Airway Plan: ETT  Plan Factors-    Induction- intravenous  Postoperative Plan- Plan for postoperative opioid use  Planned trial extubation    Informed Consent- Anesthetic plan and risks discussed with patient  I personally reviewed this patient with the CRNA  Discussed and agreed on the Anesthesia Plan with the CRNA  Rosella Goldmann

## 2018-04-02 NOTE — TELEPHONE ENCOUNTER
No auth required for this surgery   Spoke to insurance Rep  At [address] [place] [address]  04/02/18 12:50p   I will complete all referral process  Thanks!

## 2018-04-02 NOTE — PRE-PROCEDURE INSTRUCTIONS
Pre-Surgery Instructions:   Medication Instructions    albuterol (PROVENTIL HFA,VENTOLIN HFA) 90 mcg/act inhaler Instructed patient per Anesthesia Guidelines   ibuprofen (MOTRIN) 400 mg tablet Instructed patient per Anesthesia Guidelines  REVIEWED  PRINTED SURGICAL INSTRUCTIONS WITH PATIENT , PATIENT VERBALIZED UNDERSTANDING   MEDICATIONS REVIEWED  Will bring inhaler dos

## 2018-04-03 ENCOUNTER — ANESTHESIA (OUTPATIENT)
Dept: PERIOP | Facility: HOSPITAL | Age: 63
End: 2018-04-03
Payer: COMMERCIAL

## 2018-04-03 ENCOUNTER — HOSPITAL ENCOUNTER (OUTPATIENT)
Facility: HOSPITAL | Age: 63
Setting detail: OUTPATIENT SURGERY
Discharge: HOME | End: 2018-04-03
Attending: SURGERY | Admitting: SURGERY
Payer: COMMERCIAL

## 2018-04-03 VITALS
RESPIRATION RATE: 16 BRPM | TEMPERATURE: 98.9 F | WEIGHT: 238 LBS | HEIGHT: 68 IN | OXYGEN SATURATION: 97 % | DIASTOLIC BLOOD PRESSURE: 75 MMHG | SYSTOLIC BLOOD PRESSURE: 139 MMHG | BODY MASS INDEX: 36.07 KG/M2 | HEART RATE: 78 BPM

## 2018-04-03 DIAGNOSIS — K40.90 RIGHT INGUINAL HERNIA: Primary | ICD-10-CM

## 2018-04-03 PROCEDURE — 49505 PRP I/HERN INIT REDUC >5 YR: CPT | Performed by: SURGERY

## 2018-04-03 PROCEDURE — C1781 MESH (IMPLANTABLE): HCPCS | Performed by: SURGERY

## 2018-04-03 DEVICE — BARD MESH
Type: IMPLANTABLE DEVICE | Site: INGUINAL | Status: FUNCTIONAL
Brand: BARD MESH

## 2018-04-03 RX ORDER — FENTANYL CITRATE/PF 50 MCG/ML
50 SYRINGE (ML) INJECTION
Status: DISCONTINUED | OUTPATIENT
Start: 2018-04-03 | End: 2018-04-03 | Stop reason: HOSPADM

## 2018-04-03 RX ORDER — LIDOCAINE HYDROCHLORIDE 10 MG/ML
INJECTION, SOLUTION INFILTRATION; PERINEURAL AS NEEDED
Status: DISCONTINUED | OUTPATIENT
Start: 2018-04-03 | End: 2018-04-03 | Stop reason: SURG

## 2018-04-03 RX ORDER — ONDANSETRON 2 MG/ML
4 INJECTION INTRAMUSCULAR; INTRAVENOUS ONCE AS NEEDED
Status: DISCONTINUED | OUTPATIENT
Start: 2018-04-03 | End: 2018-04-03 | Stop reason: HOSPADM

## 2018-04-03 RX ORDER — TRAMADOL HYDROCHLORIDE 50 MG/1
50 TABLET ORAL EVERY 6 HOURS PRN
Status: DISCONTINUED | OUTPATIENT
Start: 2018-04-03 | End: 2018-04-03 | Stop reason: HOSPADM

## 2018-04-03 RX ORDER — TRAMADOL HYDROCHLORIDE 50 MG/1
50 TABLET ORAL EVERY 6 HOURS PRN
Qty: 20 TABLET | Refills: 0 | Status: SHIPPED | OUTPATIENT
Start: 2018-04-03 | End: 2018-10-01 | Stop reason: ALTCHOICE

## 2018-04-03 RX ORDER — SODIUM CHLORIDE, SODIUM LACTATE, POTASSIUM CHLORIDE, CALCIUM CHLORIDE 600; 310; 30; 20 MG/100ML; MG/100ML; MG/100ML; MG/100ML
50 INJECTION, SOLUTION INTRAVENOUS CONTINUOUS
Status: DISCONTINUED | OUTPATIENT
Start: 2018-04-03 | End: 2018-04-03 | Stop reason: HOSPADM

## 2018-04-03 RX ORDER — ALBUTEROL SULFATE 2.5 MG/3ML
2.5 SOLUTION RESPIRATORY (INHALATION) ONCE
Status: COMPLETED | OUTPATIENT
Start: 2018-04-03 | End: 2018-04-03

## 2018-04-03 RX ORDER — MAGNESIUM HYDROXIDE 1200 MG/15ML
LIQUID ORAL AS NEEDED
Status: DISCONTINUED | OUTPATIENT
Start: 2018-04-03 | End: 2018-04-03 | Stop reason: HOSPADM

## 2018-04-03 RX ORDER — MEPERIDINE HYDROCHLORIDE 25 MG/ML
12.5 INJECTION INTRAMUSCULAR; INTRAVENOUS; SUBCUTANEOUS ONCE AS NEEDED
Status: DISCONTINUED | OUTPATIENT
Start: 2018-04-03 | End: 2018-04-03 | Stop reason: HOSPADM

## 2018-04-03 RX ORDER — METOCLOPRAMIDE HYDROCHLORIDE 5 MG/ML
10 INJECTION INTRAMUSCULAR; INTRAVENOUS ONCE AS NEEDED
Status: DISCONTINUED | OUTPATIENT
Start: 2018-04-03 | End: 2018-04-03 | Stop reason: HOSPADM

## 2018-04-03 RX ORDER — GLYCOPYRROLATE 0.2 MG/ML
INJECTION INTRAMUSCULAR; INTRAVENOUS AS NEEDED
Status: DISCONTINUED | OUTPATIENT
Start: 2018-04-03 | End: 2018-04-03 | Stop reason: SURG

## 2018-04-03 RX ORDER — MIDAZOLAM HYDROCHLORIDE 1 MG/ML
INJECTION INTRAMUSCULAR; INTRAVENOUS AS NEEDED
Status: DISCONTINUED | OUTPATIENT
Start: 2018-04-03 | End: 2018-04-03 | Stop reason: SURG

## 2018-04-03 RX ORDER — SODIUM CHLORIDE, SODIUM LACTATE, POTASSIUM CHLORIDE, CALCIUM CHLORIDE 600; 310; 30; 20 MG/100ML; MG/100ML; MG/100ML; MG/100ML
125 INJECTION, SOLUTION INTRAVENOUS CONTINUOUS
Status: DISCONTINUED | OUTPATIENT
Start: 2018-04-03 | End: 2018-04-03 | Stop reason: HOSPADM

## 2018-04-03 RX ORDER — ONDANSETRON 2 MG/ML
INJECTION INTRAMUSCULAR; INTRAVENOUS AS NEEDED
Status: DISCONTINUED | OUTPATIENT
Start: 2018-04-03 | End: 2018-04-03 | Stop reason: SURG

## 2018-04-03 RX ORDER — ROCURONIUM BROMIDE 10 MG/ML
INJECTION, SOLUTION INTRAVENOUS AS NEEDED
Status: DISCONTINUED | OUTPATIENT
Start: 2018-04-03 | End: 2018-04-03 | Stop reason: SURG

## 2018-04-03 RX ORDER — FENTANYL CITRATE 50 UG/ML
INJECTION, SOLUTION INTRAMUSCULAR; INTRAVENOUS AS NEEDED
Status: DISCONTINUED | OUTPATIENT
Start: 2018-04-03 | End: 2018-04-03 | Stop reason: SURG

## 2018-04-03 RX ORDER — BUPIVACAINE HYDROCHLORIDE AND EPINEPHRINE 2.5; 5 MG/ML; UG/ML
INJECTION, SOLUTION INFILTRATION; PERINEURAL AS NEEDED
Status: DISCONTINUED | OUTPATIENT
Start: 2018-04-03 | End: 2018-04-03 | Stop reason: HOSPADM

## 2018-04-03 RX ORDER — PROPOFOL 10 MG/ML
INJECTION, EMULSION INTRAVENOUS AS NEEDED
Status: DISCONTINUED | OUTPATIENT
Start: 2018-04-03 | End: 2018-04-03 | Stop reason: SURG

## 2018-04-03 RX ADMIN — FENTANYL CITRATE 50 MCG: 50 INJECTION, SOLUTION INTRAMUSCULAR; INTRAVENOUS at 10:10

## 2018-04-03 RX ADMIN — ROCURONIUM BROMIDE 50 MG: 10 INJECTION INTRAVENOUS at 10:10

## 2018-04-03 RX ADMIN — PROPOFOL 100 MG: 10 INJECTION, EMULSION INTRAVENOUS at 11:35

## 2018-04-03 RX ADMIN — GLYCOPYRROLATE 0.4 MG: 0.2 INJECTION, SOLUTION INTRAMUSCULAR; INTRAVENOUS at 11:51

## 2018-04-03 RX ADMIN — ONDANSETRON 4 MG: 2 INJECTION INTRAMUSCULAR; INTRAVENOUS at 11:40

## 2018-04-03 RX ADMIN — LIDOCAINE HYDROCHLORIDE 50 MG: 10 INJECTION, SOLUTION INFILTRATION; PERINEURAL at 10:10

## 2018-04-03 RX ADMIN — NEOSTIGMINE METHYLSULFATE 3 MG: 1 INJECTION, SOLUTION INTRAMUSCULAR; INTRAVENOUS; SUBCUTANEOUS at 11:51

## 2018-04-03 RX ADMIN — MIDAZOLAM HYDROCHLORIDE 2 MG: 1 INJECTION, SOLUTION INTRAMUSCULAR; INTRAVENOUS at 10:06

## 2018-04-03 RX ADMIN — FENTANYL CITRATE 50 MCG: 50 INJECTION, SOLUTION INTRAMUSCULAR; INTRAVENOUS at 11:36

## 2018-04-03 RX ADMIN — DEXAMETHASONE SODIUM PHOSPHATE 10 MG: 10 INJECTION INTRAMUSCULAR; INTRAVENOUS at 10:13

## 2018-04-03 RX ADMIN — ALBUTEROL SULFATE 2.5 MG: 2.5 SOLUTION RESPIRATORY (INHALATION) at 12:25

## 2018-04-03 RX ADMIN — FENTANYL CITRATE 50 MCG: 50 INJECTION, SOLUTION INTRAMUSCULAR; INTRAVENOUS at 12:40

## 2018-04-03 RX ADMIN — TRAMADOL HYDROCHLORIDE 50 MG: 50 TABLET, FILM COATED ORAL at 14:09

## 2018-04-03 RX ADMIN — SODIUM CHLORIDE, SODIUM LACTATE, POTASSIUM CHLORIDE, AND CALCIUM CHLORIDE: .6; .31; .03; .02 INJECTION, SOLUTION INTRAVENOUS at 09:15

## 2018-04-03 RX ADMIN — ROCURONIUM BROMIDE 20 MG: 10 INJECTION INTRAVENOUS at 10:45

## 2018-04-03 RX ADMIN — FENTANYL CITRATE 50 MCG: 50 INJECTION, SOLUTION INTRAMUSCULAR; INTRAVENOUS at 12:45

## 2018-04-03 RX ADMIN — PROPOFOL 200 MG: 10 INJECTION, EMULSION INTRAVENOUS at 10:10

## 2018-04-03 RX ADMIN — SODIUM CHLORIDE, SODIUM LACTATE, POTASSIUM CHLORIDE, AND CALCIUM CHLORIDE 125 ML/HR: .6; .31; .03; .02 INJECTION, SOLUTION INTRAVENOUS at 09:04

## 2018-04-03 NOTE — PERIOPERATIVE NURSING NOTE
MD Luis Fernando Love paged to bedside to review cardiac Rhythm  MD Luis Fernando Love compared current with previous rhythms  No new orders at this time

## 2018-04-03 NOTE — PERIOPERATIVE NURSING NOTE
MD Lorraine Dumas paged to bedside because patient requiring 3L NC to maintain oxygen saturations above 90%  MD Lorraine Dumas instructed to send patient to [facility] where they will continue to monitor

## 2018-04-03 NOTE — H&P (VIEW-ONLY)
Office Visit - General Surgery  Alejo Craig  MRN: [id number]  Encounter: [id number]    Assessment and Plan    Problem List Items Addressed This Visit        Other    Right inguinal hernia - Primary     Large right inguinal hernia w/ contents extending to scrotum, reducible on exam    I have recommended open repair; patient with previous repair on LEFT side, there is no evidence of recurrence at his previous repair site  Discussed risks, benefits of repair  Patient is agreeable to above  Will obtain CXR and EKG for history of asthma  We will schedule surgery at earliest mutual convenience               Chief Complaint:  Joe Davis  is a 60 y o  male who presents for Follow-up (Ultrasound results)    Subjective  60 y o  M w/ symptomatic R inguinal hernia  Patient has previous L sided open repair  He denies obstructive symptoms, however occasionally has issues urinating  He has had previous left sided repair  He presents for evaluation       U/S confirms right sided inguinal hernia containing bladder    Past Medical History  Past Medical History:   Diagnosis Date    Asthma     Need for prophylactic vaccination and inoculation against influenza        Past Surgical History  Past Surgical History:   Procedure Laterality Date    HERNIA REPAIR         Family History  Family History   Problem Relation Age of Onset    No Known Problems Family     Breast cancer Maternal Aunt        Medications  Current Outpatient Prescriptions on File Prior to Visit   Medication Sig Dispense Refill    albuterol (PROVENTIL HFA,VENTOLIN HFA) 90 mcg/act inhaler Inhale 2 puffs every 4 (four) hours as needed for wheezing 1 Inhaler 0    ibuprofen (MOTRIN) 400 mg tablet Take 1 tablet by mouth every 8 (eight) hours      oxymetazoline (AFRIN) 0 05 % nasal spray 2 sprays by Each Nare route 2 (two) times a day 30 mL 0    Testosterone 12 5 MG/ACT (1%) GEL Place 50 mg on the skin every morning Apply to shoulders, upper arms or abdomen (not to genitals) each morning  1 Bottle 0    traZODone (DESYREL) 50 mg tablet Take 1 tablet by mouth daily at bedtime 30 tablet 1     No current facility-administered medications on file prior to visit  Allergies  No Known Allergies    Review of Systems   Constitutional: Negative  HENT: Negative  Eyes: Negative  Respiratory: Negative  Cardiovascular: Negative  Gastrointestinal: Negative for abdominal distention, nausea and vomiting  Endocrine: Negative  Genitourinary: Positive for difficulty urinating, scrotal swelling and testicular pain  Musculoskeletal: Negative  Skin: Negative  Allergic/Immunologic: Negative  Neurological: Negative  Hematological: Negative  Psychiatric/Behavioral: Negative  Objective  Vitals:    03/26/18 0935   BP: 144/90   Pulse: 72   Temp: (!) 97 2 °F (36 2 °C)       Physical Exam   Constitutional: He appears well-developed  No distress  Eyes: Pupils are equal, round, and reactive to light  Neck: Normal range of motion  Cardiovascular: Normal rate and regular rhythm  Pulmonary/Chest: Effort normal and breath sounds normal  No respiratory distress  Abdominal: Soft  He exhibits no distension  There is no tenderness     Right inguinal hernia, reducible when supine with hernia contents extending to right scrotum, no palapble mass on left side at site of previous open repair, incision is healed

## 2018-04-03 NOTE — ANESTHESIA POSTPROCEDURE EVALUATION
Post-Op Assessment Note      CV Status:  Stable    Mental Status:  Alert, awake and lethargic    Hydration Status:  Euvolemic    PONV Controlled:  Controlled    Airway Patency:  Patent    Post Op Vitals Reviewed: Yes          Staff: BRANDON           BP (!) (P) 97/48 (04/03/18 1213)    Temp (P) 99 4 °F (37 4 °C) (04/03/18 1213)    Pulse (P) 61 (04/03/18 1213)   Resp (P) 16 (04/03/18 1213)    SpO2 (P) 100 % (04/03/18 1213)

## 2018-04-03 NOTE — DISCHARGE INSTRUCTIONS
Inguinal Hernia   WHAT YOU NEED TO KNOW:   An inguinal hernia happens when organs or abdominal tissue push through a weak spot in the abdominal wall  The abdominal wall is made of fat and muscle  It holds the intestines in place  The hernia may contain fluid, tissue from the abdomen, or part of an organ (such as an intestine)         DISCHARGE INSTRUCTIONS:   Seek care immediately if:   · You have severe abdominal pain with nausea and vomiting       · Your abdomen is larger than usual       · Your hernia gets bigger or is purple or blue       · You see blood in your bowel movements      · You feel weak, dizzy, or faint  Contact your healthcare provider if:   · You have a fever      · You have questions or concerns about your condition or care  Medicine: You may need the following:     · Take your medicine as directed  Contact your healthcare provider if you think your medicine is not helping or if you have side effects  Tell him or her if you are allergic to any medicine  Keep a list of the medicines, vitamins, and herbs you take  Include the amounts, and when and why you take them  Bring the list or the pill bottles to follow-up visits  Carry your medicine list with you in case of an emergency  Follow up with your healthcare provider as directed: Write down your questions so you remember to ask them during your visits  Manage your symptoms and prevent another hernia:     · Do not lift anything heavy  Heavy lifting can make your hernia worse or cause another hernia  Ask your healthcare provider how much is safe for you to lift  Please do not lift more than 20 lbs for at least 4 weeks  No strenuous exercise for 4 weeks       · Drink liquids as directed  Liquids may prevent constipation and straining during a bowel movement  Ask how much liquid to drink each day and which liquids are best for you       · Eat foods high in fiber  Fiber may prevent constipation and straining during a bowel movement   Foods that contain fiber include fruits, vegetables, beans, lentils, and whole grains       · Maintain a healthy weight  If you are overweight, weight loss may prevent your hernia from getting worse  It may also prevent another hernia  Talk to your healthcare provider about exercise and how to lose weight safely if you are overweight       · Do not smoke  Nicotine and other chemicals in cigarettes and cigars can weaken the abdominal wall  This may increase your risk for another hernia  Ask your healthcare provider for information if you currently smoke and need help to quit  E-cigarettes or smokeless tobacco still contain nicotine  Talk to your healthcare provider before you use these products  Please follow-up as scheduled  Activity:  - No lifting greater than 20 pounds or strenuous physical activity or exercise for 2 weeks  - Walking and normal light activities are encouraged  - Normal daily activities including climbing steps are okay  - No driving until no longer using pain medications  Diet:    - You may resume your normal diet  Wound Care:  - May shower daily  No tub baths or swimming until cleared by your surgeon   - Wash incision gently with soap and water and pat dry  - Do not apply any creams or ointments unless instructed to do so by your surgeon   - Goldie Lower may apply ice as needed (no longer than 20 minutes at a time) for the first 48 hours  - Bruising is not unusual and will go away with a little time  You may apply a warm, moist compress that may help the bruising resolve quicker  - You may remove the dressings the day after surgery (unless otherwise instructed)  Leave any skin tapes (steri-strips) on the incision(s) in place until they fall off on their own  Any new dressings are optional     Medications:    - You may resume all of your regular medications, including blood thinners and aspirin, after going home unless otherwise instructed   Please refer to your discharge medication list for further details  - Please take the pain medications as directed  - You are encouraged to use non-narcotic pain medications first and whenever possible  Reserve the use of narcotic pain medication for moderate to severe pain not controlled by non-narcotic medications   - No driving while taking narcotic pain medications  - You may become constipated, especially if taking pain medications  You may take any over the counter stool softeners or laxatives as needed  Examples: Milk of Magnesia, Colace, Senna  Additional Instructions:  - If you have any questions or concerns after discharge please call the office   - Call office or return to ER if fever greater than 101, chills, persistent nausea/vomiting, worsening/uncontrollable pain, and/or increasing redness or purulent/foul smelling drainage from incision(s)

## 2018-04-03 NOTE — OP NOTE
OPERATIVE REPORT  PATIENT NAME: Molly Langston  :  1955  MRN: [id number]  Pt Location: [facility] OR ROOM 08    SURGERY DATE: 4/3/2018    Surgeon(s) and Role:     * Corrine Galvan MD - Primary     * Jefry Mulligan MD - Assisting    Preop Diagnosis:  Right inguinal hernia [K40 90]    Post-Op Diagnosis Codes:     * Right inguinal hernia [K40 90]    Procedure(s) (LRB):  REPAIR HERNIA INGUINAL (Right)    Specimen(s):  * No specimens in log *    Estimated Blood Loss:   Minimal    Drains:       Anesthesia Type:   General    Operative Indications:  Right inguinal hernia [K40 90]      Operative Findings:  Large right inguinal hernia direct and indirect component     Complications:   None    Procedure and Technique:  After informed consent, risks and benefits of the procedure were explained to the patient, the patient was brought to the operating room, prepped and draped in the normal sterile fashion  The right inguinal ligament was identified from the pubic tubercle to the anterior superior iliac spine  Two fingerbreadths above the pubic tubercle, an incision along the inguinal ligament was made  First, the skin was anesthetized with bupivacaine and then an incision was made with a #15 blade scalpel, approximately 5 cm in length  Dissection was then carried down with electro Bovie cautery through Joses fascia maintaining hemostasis  Once the external oblique was identified, external oblique was incised in the length of its fibers with a Bovie cautery and extended  in both directions down to the external ring  Next, the external oblique was grasped with hemostats on both sides  The spermatic cord was identified and encircled with a Penrose drain  Next, the cremasterics were identified and stripped identifying the hernia sac  The hernia sac was identfied, and was noted to be containing bowel   Next, the hernia sac was placed back into the defect   Next a 1x4 piece of mesh was used in a lichentstein repair  The mesh was secured to the pubic tubercle with 3-0 prolene  This was run on the lateral aspect of the mesh to the shelving edge  The internal ring was then recreated by securing the tails of the mesh beyond the spermatic cord  The external oblique was then closed over the roof with a running #2-0 vicryl suture, taking care not to strangulate the cord and to recreate the external ring  After infusing bupivacaine into the external ring and around the cord structures for anesthetic, the Joses fascia was approximated with interrupted #2-0 Vicryl sutures  The skin was closed with a running subcuticular #4-0 monocryl suture and then histoacryl was placed on the skin  The patient tolerated the procedure well and was transferred to Recovery in stable condition  Dr Ervin Camarena was present for the entire case       Patient Disposition:  PACU     SIGNATURE: Amanda Mckinney  DATE: April 3, 2018  TIME: 12:18 PM

## 2018-04-09 DIAGNOSIS — K40.90 RIGHT INGUINAL HERNIA: ICD-10-CM

## 2018-04-09 RX ORDER — TRAMADOL HYDROCHLORIDE 50 MG/1
50 TABLET ORAL EVERY 6 HOURS PRN
Qty: 20 TABLET | Refills: 0 | Status: CANCELLED | OUTPATIENT
Start: 2018-04-09

## 2018-04-09 NOTE — TELEPHONE ENCOUNTER
[facility] CALLED STATING SOMEONE FROM THIS OFFICE CALLED AND LEFT MESSAGE ON THEIR VOICEMAIL FOR PTS  TRAMADOL AND HE NEEDS Dr Janette Shelton #  I PROVIDED THE NAME BUT I DO NOT HAVE HIS DILLAN # SO HE SAID NPI WAS FINE  I PROVIDED HIM WITH THE NUMBER

## 2018-04-16 ENCOUNTER — OFFICE VISIT (OUTPATIENT)
Dept: MULTI SPECIALTY CLINIC | Facility: CLINIC | Age: 63
End: 2018-04-16
Payer: COMMERCIAL

## 2018-04-16 VITALS
TEMPERATURE: 98.1 F | HEART RATE: 72 BPM | DIASTOLIC BLOOD PRESSURE: 80 MMHG | SYSTOLIC BLOOD PRESSURE: 110 MMHG | BODY MASS INDEX: 35.42 KG/M2 | HEIGHT: 68 IN | WEIGHT: 233.69 LBS

## 2018-04-16 DIAGNOSIS — K40.90 RIGHT INGUINAL HERNIA: Primary | ICD-10-CM

## 2018-04-16 PROCEDURE — 99024 POSTOP FOLLOW-UP VISIT: CPT | Performed by: SURGERY

## 2018-04-16 NOTE — ASSESSMENT & PLAN NOTE
S/p Right inguinal hernia repair 4/3   Doing well, minimal pain, urinating without problem   Patient okay for discharge, resume light physical activity in 2 weeks     No need for suture removal as closed with disolvable sutures

## 2018-04-16 NOTE — PROGRESS NOTES
Office Visit - General Surgery  Alejo Flowers  MRN: [id number]  Encounter: [id number]    Assessment and Plan    Problem List Items Addressed This Visit        Other    Right inguinal hernia - Primary     S/p Right inguinal hernia repair 4/3   Doing well, minimal pain, urinating without problem   Patient okay for discharge, resume light physical activity in 2 weeks  No need for suture removal as closed with disolvable sutures                Chief Complaint:  Jered Clemens  is a 60 y o  male who presents for Post-op (Right Inguinal Hernia)    Subjective  Patient is s/p Right inguinal hernia repair on 4/3  Patient has been home and doing well  No nausea/vomiging  No shortnses of breath  No swelling  Not utilizing pain medication     Past Medical History  Past Medical History:   Diagnosis Date    Asthma     Need for prophylactic vaccination and inoculation against influenza        Past Surgical History  Past Surgical History:   Procedure Laterality Date    HERNIA REPAIR Left     with mesh    SD REPAIR ING HERNIA,5+Y/O,REDUCIBL Right 4/3/2018    Procedure: REPAIR HERNIA INGUINAL;  Surgeon: Maddie Roberson MD;  Location: [facility];  Service: General       Family History  Family History   Problem Relation Age of Onset    No Known Problems Family     Breast cancer Maternal Aunt        Medications  Current Outpatient Prescriptions on File Prior to Visit   Medication Sig Dispense Refill    albuterol (PROVENTIL HFA,VENTOLIN HFA) 90 mcg/act inhaler Inhale 2 puffs every 4 (four) hours as needed for wheezing 1 Inhaler 0    traMADol (ULTRAM) 50 mg tablet Take 1 tablet (50 mg total) by mouth every 6 (six) hours as needed for moderate pain for up to 20 doses 20 tablet 0     No current facility-administered medications on file prior to visit  Allergies  No Known Allergies    Review of Systems   Constitutional: Negative for chills and fever  HENT: Negative for congestion  Respiratory: Negative for cough  Cardiovascular: Negative for chest pain  Gastrointestinal: Negative for abdominal pain, diarrhea and nausea  Genitourinary: Negative for dysuria  Musculoskeletal: Negative for back pain  Neurological: Negative for numbness and headaches  Objective  Vitals:    04/16/18 1119   BP: 110/80   Pulse: 72   Temp: 98 1 °F (36 7 °C)       Physical Exam   Constitutional: He appears well-developed and well-nourished  HENT:   Head: Normocephalic and atraumatic  Eyes: Pupils are equal, round, and reactive to light  Neck: Normal range of motion  Cardiovascular: Normal rate  Abdominal: Soft  No evidence of hernia   Right inguinal hernia scar clean and dry

## 2018-10-01 ENCOUNTER — OFFICE VISIT (OUTPATIENT)
Dept: INTERNAL MEDICINE CLINIC | Facility: CLINIC | Age: 63
End: 2018-10-01
Payer: COMMERCIAL

## 2018-10-01 VITALS
HEIGHT: 68 IN | HEART RATE: 84 BPM | SYSTOLIC BLOOD PRESSURE: 130 MMHG | WEIGHT: 231.48 LBS | BODY MASS INDEX: 35.08 KG/M2 | DIASTOLIC BLOOD PRESSURE: 90 MMHG | TEMPERATURE: 98 F

## 2018-10-01 DIAGNOSIS — Z23 NEED FOR PROPHYLACTIC VACCINATION AND INOCULATION AGAINST INFLUENZA: ICD-10-CM

## 2018-10-01 DIAGNOSIS — N40.0 BPH (BENIGN PROSTATIC HYPERPLASIA): ICD-10-CM

## 2018-10-01 DIAGNOSIS — Z23 NEED FOR INFLUENZA VACCINATION: ICD-10-CM

## 2018-10-01 DIAGNOSIS — F32.A DEPRESSION: ICD-10-CM

## 2018-10-01 DIAGNOSIS — M25.569 ACUTE KNEE PAIN: Primary | ICD-10-CM

## 2018-10-01 PROCEDURE — 90682 RIV4 VACC RECOMBINANT DNA IM: CPT | Performed by: INTERNAL MEDICINE

## 2018-10-01 PROCEDURE — 90471 IMMUNIZATION ADMIN: CPT | Performed by: INTERNAL MEDICINE

## 2018-10-01 PROCEDURE — 99213 OFFICE O/P EST LOW 20 MIN: CPT | Performed by: INTERNAL MEDICINE

## 2018-10-01 RX ORDER — NAPROXEN 375 MG/1
375 TABLET, DELAYED RELEASE ORAL 2 TIMES DAILY PRN
Qty: 90 TABLET | Refills: 2 | Status: SHIPPED | OUTPATIENT
Start: 2018-10-01 | End: 2021-08-17 | Stop reason: SDUPTHER

## 2018-10-01 NOTE — PROGRESS NOTES
INTERNAL MEDICINE OFFICE VISIT  [facility]  [address] [place], [place] [address]    NAME: Christina Bowen  AGE: 58 y o  SEX: male    DATE OF ENCOUNTER: 10/1/2018    Assessment and Plan     1  Acute knee pain  - Patient presents with persistent R knee pain with swelling and crepitus   - Has history of trauma and strenuous activity of knees  - Utilizes Ibuprofen as needed for pain with minimal relief  - X-ray of R knee  - Naproxen 375 mg as needed for pain   - Physical therapy for quadricep strengthening   - Voltaren gel       Chief Complaint     Knee Pain     History of Present Illness     Mr Christina Bowen is a 58year old male with a PMHx significant for depression, R inguinal hernia s/p repair, and BPH who presents as a same day office visit for acute R knee pain  The patient states that he banged his right knee against a dumpster at work a few years ago  The pain started then and has been off and on since  It is worse with walking and squatting  He was prescribed Ibuprofen which seems to alleviate the pain  He also wraps his knee when the pain is not tolerable  He states that the R knee is often swollen, and will "crack" while walking up stairs from time to time  It does not appear that imaging has ever been performed  Review of Systems     Review of Systems   Constitutional: Negative  HENT: Negative  Eyes: Negative  Respiratory: Negative  Cardiovascular: Negative  Gastrointestinal: Negative  Endocrine: Negative  Genitourinary: Negative  Musculoskeletal: Positive for joint swelling  R Knee Pain   Skin: Negative  Allergic/Immunologic: Negative  Neurological: Negative  Hematological: Negative  Psychiatric/Behavioral: Negative          Active Problem List     Patient Active Problem List   Diagnosis    Depression    BPH (benign prostatic hyperplasia)    Impaired fasting glucose    Right inguinal hernia    Obesity    Fatigue    Healthcare maintenance    Need for influenza vaccination    Acute knee pain       Objective     There were no vitals taken for this visit  Physical Exam   Constitutional: He is oriented to person, place, and time  He appears well-developed and well-nourished  No distress  HENT:   Head: Normocephalic and atraumatic  Eyes: Pupils are equal, round, and reactive to light  Conjunctivae are normal  Right eye exhibits no discharge  Left eye exhibits no discharge  No scleral icterus  Neck: Normal range of motion  Neck supple  No JVD present  No tracheal deviation present  No thyromegaly present  Cardiovascular: Normal rate, regular rhythm and normal heart sounds  Exam reveals no gallop and no friction rub  No murmur heard  Pulmonary/Chest: Effort normal and breath sounds normal  No stridor  No respiratory distress  He has no wheezes  He has no rales  Abdominal: Soft  Bowel sounds are normal  He exhibits no distension  There is no tenderness  There is no rebound and no guarding  Musculoskeletal: Normal range of motion  He exhibits tenderness  He exhibits no edema or deformity  Neurological: He is alert and oriented to person, place, and time  Skin: Skin is warm and dry  No rash noted  He is not diaphoretic  No erythema  Psychiatric: He has a normal mood and affect   His behavior is normal        Pertinent Laboratory/Diagnostic Studies:  CBC:   Lab Results   Component Value Date/Time    WBC 8 39 01/25/2018 08:06 AM    RBC 4 68 01/25/2018 08:06 AM    HGB 14 8 01/25/2018 08:06 AM    HCT 43 9 01/25/2018 08:06 AM    MCV 94 01/25/2018 08:06 AM    MCH 31 6 01/25/2018 08:06 AM    MCHC 33 7 01/25/2018 08:06 AM    RDW 14 3 01/25/2018 08:06 AM    MPV 10 8 01/25/2018 08:06 AM     01/25/2018 08:06 AM    NRBC 0 01/25/2018 08:06 AM    NEUTOPHILPCT 58 01/25/2018 08:06 AM    LYMPHOPCT 28 01/25/2018 08:06 AM    MONOPCT 10 01/25/2018 08:06 AM    EOSPCT 4 01/25/2018 08:06 AM    BASOPCT 0 01/25/2018 08:06 AM    NEUTROABS 4 83 01/25/2018 08:06 AM    LYMPHSABS 2 32 01/25/2018 08:06 AM    MONOSABS 0 87 01/25/2018 08:06 AM    EOSABS 0 30 01/25/2018 08:06 AM     Chemistry Profile:   Lab Results   Component Value Date/Time     01/25/2018 08:06 AM    K 3 8 01/25/2018 08:06 AM     (H) 01/25/2018 08:06 AM    CO2 27 01/25/2018 08:06 AM    BUN 18 01/25/2018 08:06 AM    CREATININE 1 10 01/25/2018 08:06 AM    GLUF 103 (H) 01/25/2018 08:06 AM    CALCIUM 8 6 01/25/2018 08:06 AM    EGFR 71 01/25/2018 08:06 AM     Endocrine Studies:   Lab Results   Component Value Date/Time    HGBA1C 5 9 01/25/2018 08:06 AM    HGBA1C 5 9 (H) 09/19/2014 12:09 PM    XWD7LMZJYBHZ 2 690 01/25/2018 08:06 AM    MEG3BBDPQRSZ 2 025 09/19/2014 12:09 PM     Health Maintenance:   Lab Results   Component Value Date/Time    PSA 1 0 09/19/2014 12:09 PM     Hematology:   Lab Results   Component Value Date/Time    MWOPRHYQ54 350 01/25/2018 08:06 AM       Current Medications     Current Outpatient Prescriptions:     albuterol (PROVENTIL HFA,VENTOLIN HFA) 90 mcg/act inhaler, Inhale 2 puffs every 4 (four) hours as needed for wheezing, Disp: 1 Inhaler, Rfl: 0    traMADol (ULTRAM) 50 mg tablet, Take 1 tablet (50 mg total) by mouth every 6 (six) hours as needed for moderate pain for up to 20 doses, Disp: 20 tablet, Rfl: 0    Health Maintenance     Health Maintenance   Topic Date Due    CRC Screening: Colonoscopy  1955    DTaP,Tdap,and Td Vaccines (1 - Tdap) 05/09/1975    INFLUENZA VACCINE  09/01/2018     Immunization History   Administered Date(s) Administered     Influenza (IM) Preservative Free 01/24/2018    Influenza TIV (IM) 10/25/2013       Yelitza Has, DO  Internal Medicine  PGY-1  10/1/2018 12:44 PM

## 2018-10-01 NOTE — PATIENT INSTRUCTIONS
Ejercicios para la rodilla   CUIDADO AMBULATORIO:   Lo que necesita saber acerca de los ejercicios para la rodilla:  Los ejercicios para la rodilla ayudan a fortalecer los músculos alrededor de lemus rodilla  Unos músculos david pueden ayudar a reducir el dolor y disminuir el riesgo de sufrir sujit lesión en el futuro  Los ejercicios para la rodilla también pueden ayudarlo a recuperarse después de Alber[place] Elvie Montrose Dark  · Empiece despacio  Estos son Izella Pia básicos  Pregúntele a lemus médico si usted necesita acudir con un fisioterapeuta para que le indique ejercicios más avanzado  A medida que se sienta más francisca, es posible que pueda realizar más series de cada ejercicio o añadir pesas  · Deténgase si siente dolor  Es normal que sienta cierta molestia al principio  Practicar los ejercicios con regularidad ayudará a disminuir lemus incomodidad con el paso del [place]  · Realice los [address] piernava  [place] esto para [address] se mantengan david  · Entre en calor antes de hacer los ejercicios para la rodilla  Use sujit bicicleta estacionaria o camine mic 5 a 10 minutos para que scar músculos entren en calor  Cómo realizar estiramientos de la rodilla de forma jauregui:  Siempre ejecute los ejercicios de calentamiento antes de hacer cualquier ejercicio de acondicionamiento  [place] estos ejercicios de estiramiento sujit vez más después de hacer los ejercicios de fortalecimiento  Realice estos ejercicios de estiramiento entre 4 o 5 días a la semana o según se lo indicaron  · [employer], calentamiento para la pantorrilla:  De frente a sujit pared, coloque ambas terrell abiertas contra la pared, o agárrese del espaldar de sujit silla para mantener el equilibrio  Mantenga las rodillas ligeramente dobladas  Dé un gran paso para atrás con sujit pierna  Deje lemus otra pierna directamente debajo de arthur Anthony y presione con scar caderas hacia adelante   Jenna Ramos estiramiento por 30 segundos  Cambie de pierna  Repita patricia ejercicio 2 o 3 veces con cada pierna  · Estiramiento de los cuádriceps de pie:  [employer], coloque sujit mano contra sujit pared, o agárrese del espaldar de sujit silla para mantener el equilibrio  Cargue el peso de lemus cuerpo en sujit pierna, flexione la rodilla de la otra pierna y tómese del tobillo  Acerque el tobillo a scar nalgas  Sostenga el estiramiento de 30 a 60 segundos  Cambie de pierna  Repita patricia ejercicio 2 o 3 veces con cada pierna  · Sentado, estiramiento del tendón de la corva, parte posterior del muslo:  Siéntese en sujit superficie plana en el piso con las dos piernas enfrente de usted  No flexione scar dedos de los pies ni los ponga en puntas  Coloque las jorge de scar terrell en el piso y deslice scar terrell hacia adelante hasta que usted sienta sujit resistencia o un estiramiento leve  Debe mantener la espalda derecha  Sostenga el estiramiento por 30 segundos  Repita 2 o 3 veces  Cómo realizar ejercicios de fortalecimiento de la rodilla de manera jauregui:  Realice estos ejercicios 4 o 5 días a la semana o según le indicaron  · Medias sentadillas de pie:  Párese con los pies a la distancia de scar hombros  Lleve lemus espalda contra sujit pared o agárrese del espaldar de sujit silla para mantener el equilibrio, si es necesario  [id number] y baje unas 10 pulgadas lentamente, joseline si fuera a sentarse en sujit silla  [place] de lemus cuerpo debería encontrarse mayormente sobre scar talones  Sostenga las sentadillas por 5 segundos, luego regresa a la posición inicial  Realice 3 series de 10 sentadillas para fortalecer los glúteos y los muslos  · De pie flexión de los músculos isquiotibiales:  De frente a sujit pared, coloque ambas terrell abiertas contra la pared, o agárrese del espaldar de sujit silla para mantener el equilibrio   Cargue el peso de lemus cuerpo en sujit pierna, levante otra pierna y lleve lemus talón tan cerca de los glúteos joseline pueda  Sostenga por 5 segundos y baje zendejas pierna  Realice 2 series de 10 flexiones con cada pierna  Ioana ejercicio fortalece el músculo de la parte posterior de zendejas muslo  · De pie levantamiento de las pantorillas o gémelos:  De frente a sujit pared, coloque ambas terrell abiertas contra la pared, o agárrese del espaldar de sujit silla para mantener el equilibrio  Póngase de pie derecho, y no se richard hacia adelante  Coloque todo zendejas peso sobre sujit yen pierna levantando el otro pie del suelo  Levante el talón del pie que está en el piso tan alto joseline pueda y [place]  Realice 2 series de 10 levantamientos de pantorilla con cada pierna para fortalecer los músculos de la pantorilla  · Enderezar la pierna y levantarla:  Acuéstese boca abajo con las piernas estiradas  Cruce scar brazos enfrente de usted y descanse la frente sobre scar brazos cruzados  Apriete el músculo de zendejas pierna y levántela tanto joseline pueda  Sostenga por 5 segundos, y luego baje zendejas pierna  Realice 2 series de 10 levantamientos con cada pierna para fortalecer scar glúteos  · Sentado, levantamiento de pierna:  Siéntese en sujit silla  Despacio enderece y levante sujit pierna  Contraiga el músculo de zendejas muslo y sostenga por 5 segundos  Relaje y regrese zendejas pie al piso  Realice 2 series de 10 levantamientos con cada pierna  [place] le ayuda a fortalecer el músculo anterior de zendejas muslo  Pregúntele a zendejas Heddy Bowman vitaminas y minerales son adecuados para usted  · Usted tiene un nuevo dolor o el dolor Sebastian Hoskins  · Usted tiene preguntas o inquietudes acerca de zendejas condición o cuidado  © 2017 [address] Information is for End User's use only and may not be sold, redistributed or otherwise used for commercial purposes  All illustrations and images included in CareNotes® are the copyrighted property of A D A M , Inc  or [employer]  Esta información es sólo para uso en educación   Zendejas intención no es darle un consejo médico sobre enfermedades o tratamientos  Colsulte con lemus Meera Derick farmacéutico antes de seguir cualquier régimen médico para saber si es seguro y efectivo para usted

## 2018-10-03 ENCOUNTER — HOSPITAL ENCOUNTER (EMERGENCY)
Facility: HOSPITAL | Age: 63
Discharge: HOME | End: 2018-10-03
Attending: EMERGENCY MEDICINE | Admitting: EMERGENCY MEDICINE
Payer: COMMERCIAL

## 2018-10-03 ENCOUNTER — APPOINTMENT (EMERGENCY)
Dept: RADIOLOGY | Facility: HOSPITAL | Age: 63
End: 2018-10-03
Payer: COMMERCIAL

## 2018-10-03 VITALS
OXYGEN SATURATION: 98 % | WEIGHT: 231.48 LBS | SYSTOLIC BLOOD PRESSURE: 153 MMHG | RESPIRATION RATE: 18 BRPM | HEART RATE: 69 BPM | DIASTOLIC BLOOD PRESSURE: 94 MMHG | BODY MASS INDEX: 35.2 KG/M2 | TEMPERATURE: 98.1 F

## 2018-10-03 DIAGNOSIS — M25.561 RIGHT KNEE PAIN: Primary | ICD-10-CM

## 2018-10-03 PROCEDURE — 73564 X-RAY EXAM KNEE 4 OR MORE: CPT

## 2018-10-03 PROCEDURE — 99283 EMERGENCY DEPT VISIT LOW MDM: CPT

## 2018-10-03 NOTE — ED ATTENDING ATTESTATION
Julio Rodriguez MD, saw and evaluated the patient  I have discussed the patient with the resident/non-physician practitioner and agree with the resident's/non-physician practitioner's findings, Plan of Care, and MDM as documented in the resident's/non-physician practitioner's note, except where noted  All available labs and Radiology studies were reviewed  At this point I agree with the current assessment done in the Emergency Department  I have conducted an independent evaluation of this patient a history and physical is as follows:   chronic knee pain with no acute trauma  No effusion, does have some crepitus, stable knee, neurovascularly intact  X-ray consistent with osteoarthritis    Agree with documentation    Critical Care Time  CritCare Time    Procedures

## 2018-10-03 NOTE — DISCHARGE INSTRUCTIONS
Arthritis   WHAT YOU NEED TO KNOW:   Arthritis is a disease that causes inflammation in one or more joints  There are many types of arthritis, such as osteoarthritis, rheumatoid arthritis, and septic arthritis  Some types cause inflammation in the joints  Other types wear away the cartilage between joints  This makes the bones of the joint rub together when you move the joint  Your symptoms may be constant, or symptoms may come and go  Arthritis often gets worse over time and can cause permanent joint damage  DISCHARGE INSTRUCTIONS:   Return to the emergency department if:   · You have a fever and severe joint pain or swelling  · You cannot move the affected joint  · You have severe joint pain you cannot tolerate  Contact your healthcare provider if:   · Your pain or swelling does not get better with treatment  · You have questions or concerns about your condition or care  Medicines:   · Acetaminophen  decreases pain and fever  It is available without a doctor's order  Ask how much to take and how often to take it  Follow directions  Acetaminophen can cause liver damage if not taken correctly  · NSAIDs , such as ibuprofen, help decrease swelling, pain, and fever  This medicine is available with or without a doctor's order  NSAIDs can cause stomach bleeding or kidney problems in certain people  If you take blood thinner medicine, always ask your healthcare provider if NSAIDs are safe for you  Always read the medicine label and follow directions  · Steroids  reduce swelling and pain  · Prescription pain medicine  may be given  Do not wait until the pain is severe before you take your medicine  Ask your healthcare provider how to take this medicine safely  · Take your medicine as directed  Contact your healthcare provider if you think your medicine is not helping or if you have side effects  Tell him of her if you are allergic to any medicine   Keep a list of the medicines, vitamins, and herbs you take  Include the amounts, and when and why you take them  Bring the list or the pill bottles to follow-up visits  Carry your medicine list with you in case of an emergency  Follow up with your healthcare provider or rheumatologist as directed:  Write down your questions so you remember to ask them during your visits  Manage arthritis:   · Rest your painful joint so it can heal   Your healthcare provider may recommend crutches or a walker if the affected joint is in a leg  · Apply ice or heat to the joint  Both can help decrease swelling and pain  Ice may also help prevent tissue damage  Use an ice pack, or put crushed ice in a plastic bag  Cover it with a towel and place it on your joint for 15 to 20 minutes every hour or as directed  You can apply heat for 20 minutes every 2 hours  Heat treatment includes hot packs or heat lamps  · Elevate your joint  Elevation helps reduce swelling and pain  Raise your joint above the level of your heart as often as you can  Prop your painful joint on pillows to keep it above your heart comfortably  · Go to therapy as directed  A physical therapist can teach you exercises to improve flexibility and range of motion  You may also be shown non-weight-bearing exercises that are safe for your joints, such as swimming  Exercise can help keep your joints flexible and reduce pain  An occupational therapist can help you learn to do your daily activities when your joints are stiff or sore  · Maintain a healthy weight  Extra weight puts increased pressure on your joints  Ask your healthcare provider what you should weigh  If you need to lose weight, he can help you create a weight loss program  Weight loss can help reduce pain and increase your ability to do your activities  The amount of exercise you do may vary each day, depending on your symptoms  · Wear flat or low-heeled shoes    This will help decrease pain and reduce pressure on your ankle, knee, and hip joints  · Use support devices  You may be given splints to wear on your hands to help your joints rest and to decrease inflammation  While you sleep, use a pillow that is firm enough to support your neck and head  Support equipment:  The following may help you move and prevent falls:  · Orthotic shoes or insoles  help support your feet when you walk  · Crutches, a cane, or a walker  may help decrease your risk for falling  They also decrease stress on affected joints  · Devices to prevent falls  include raised toilet seats and bathtub bars to help you get up from sitting  Handrails can be placed in areas where you need balance and support  © 2017 [address] [facility] Information is for End User's use only and may not be sold, redistributed or otherwise used for commercial purposes  All illustrations and images included in CareNotes® are the copyrighted property of A D A M , Inc  or [employer]  The above information is an  only  It is not intended as medical advice for individual conditions or treatments  Talk to your doctor, nurse or pharmacist before following any medical regimen to see if it is safe and effective for you

## 2018-10-03 NOTE — ED PROVIDER NOTES
History  Chief Complaint   Patient presents with    Knee Pain     Patient complains of intermittent right knee pain  Came to get x-rays  HPI     57yo male presents for evaluation of right knee pain  Patient says he has had chronic right knee pain since a fall he had a few years back  Patient says over recent months pain has been gradually worsening  He localizes pain to knee and says it does not radiate  Pain is a 5 out of 10  He notes pain occurs when trying to stand up after sitting for some time  He believes resting the knee trigegrs the pain  He admits that when he is active he has minimal pain  Patient says he follows with the clinic for this pain and was recommended to see physical therapy which he has not done  Denies any acute injuries  Denies weakness, numbness/tingling, or gait disturbance  Prior to Admission Medications   Prescriptions Last Dose Informant Patient Reported? Taking?    albuterol (PROVENTIL HFA,VENTOLIN HFA) 90 mcg/act inhaler 10/3/2018 at Unknown time Self No Yes   Sig: Inhale 2 puffs every 4 (four) hours as needed for wheezing   diclofenac sodium (VOLTAREN) 1 % 10/3/2018 at Unknown time  No Yes   Sig: Apply 2 g topically 4 (four) times a day   naproxen (EC NAPROSYN) 375 MG TBEC 10/3/2018 at Unknown time  No Yes   Sig: Take 1 tablet (375 mg total) by mouth 2 (two) times a day as needed for mild pain      Facility-Administered Medications: None       Past Medical History:   Diagnosis Date    Asthma     Need for prophylactic vaccination and inoculation against influenza        Past Surgical History:   Procedure Laterality Date    HERNIA REPAIR Left     with mesh    NC REPAIR ING HERNIA,5+Y/O,REDUCIBL Right 4/3/2018    Procedure: REPAIR HERNIA INGUINAL;  Surgeon: Yunior tSone MD;  [facility];  Service: General       Family History   Problem Relation Age of Onset    No Known Problems Family     Breast cancer Maternal Aunt      I have reviewed and agree with the history as documented  Social History   Substance Use Topics    Smoking status: Never Smoker    Smokeless tobacco: Never Used    Alcohol use Yes      Comment: socials  Review of Systems   Constitutional: Negative for chills, diaphoresis, fatigue and fever  HENT: Negative for congestion, rhinorrhea and sore throat  Eyes: Negative for photophobia and visual disturbance  Respiratory: Negative for cough, chest tightness and shortness of breath  Cardiovascular: Negative for chest pain and palpitations  Gastrointestinal: Negative for abdominal pain, blood in stool, constipation, diarrhea, nausea and vomiting  Genitourinary: Negative for dysuria, frequency and hematuria  Musculoskeletal: Positive for arthralgias  Negative for back pain, gait problem, joint swelling, myalgias, neck pain and neck stiffness  Skin: Negative for pallor and rash  Neurological: Negative for weakness, light-headedness, numbness and headaches  Hematological: Negative for adenopathy  Does not bruise/bleed easily  All other systems reviewed and are negative  Physical Exam  ED Triage Vitals [10/03/18 1359]   Temperature Pulse Respirations Blood Pressure SpO2   98 1 °F (36 7 °C) 69 18 153/94 98 %      Temp Source Heart Rate Source Patient Position - Orthostatic VS BP Location FiO2 (%)   Tympanic Monitor Sitting Left arm --      Pain Score       6           Orthostatic Vital Signs  Vitals:    10/03/18 1359   BP: 153/94   Pulse: 69   Patient Position - Orthostatic VS: Sitting       Physical Exam   Constitutional: He is oriented to person, place, and time  He appears well-developed and well-nourished  No distress  Patient alert and oriented, appears well and non-toxic, in no acute distress    HENT:   Head: Normocephalic and atraumatic  Eyes: Pupils are equal, round, and reactive to light  Conjunctivae and EOM are normal    Neck: Normal range of motion  Neck supple     Cardiovascular: Normal rate, regular rhythm, normal heart sounds and intact distal pulses  Pulmonary/Chest: Effort normal and breath sounds normal  No respiratory distress  He has no rales  Abdominal: Soft  Bowel sounds are normal  He exhibits no distension  There is no tenderness  Musculoskeletal: Normal range of motion  He exhibits no edema, tenderness or deformity  Right knee- No obvious effusion or deformity noted  Full ROM, neurovascularly intact, + crepitus on extension and flexion of knee   Lymphadenopathy:     He has no cervical adenopathy  Neurological: He is alert and oriented to person, place, and time  No cranial nerve deficit or sensory deficit  He exhibits normal muscle tone  Skin: Skin is warm and dry  Capillary refill takes less than 2 seconds  No rash noted  He is not diaphoretic  No erythema  No pallor  Psychiatric: He has a normal mood and affect  His behavior is normal  Judgment and thought content normal    Nursing note and vitals reviewed  ED Medications  Medications - No data to display    Diagnostic Studies  Results Reviewed     None                 XR knee 4+ vw right injury   Final Result by An Strong MD (10/03 8414)      Mild tricompartmental degenerative changes              Workstation performed: [id number]               Procedures  Procedures      Phone Consults  ED Phone Contact    ED Course                               MDM  Number of Diagnoses or Management Options  Right knee pain:   Diagnosis management comments: Impression: 55yo male presents for evaluation of right knee pain  Ddx: arthritis   Plan: xray, reassurance, ortho and PT follow up     CritCare Time    Disposition  Final diagnoses:   Right knee pain     Time reflects when diagnosis was documented in both MDM as applicable and the Disposition within this note     Time User Action Codes Description Comment    10/3/2018  4:52 PM Angela Yadav Add [D53 216] Right knee pain       ED Disposition     ED Disposition Condition Comment Discharge  Alejo Davidchristiane Dasilva  discharge to home/self care  Condition at discharge: Good        Follow-up Information     Follow up With Specialties Details Why [address] Family Medicine Go to As needed, If symptoms worsen [address] [facility] [id number]  [facility] Orthopedic Surgery Go to As needed, If symptoms worsen [address] [facility]          Discharge Medication List as of 10/3/2018  4:55 PM      CONTINUE these medications which have NOT CHANGED    Details   albuterol (PROVENTIL HFA,VENTOLIN HFA) 90 mcg/act inhaler Inhale 2 puffs every 4 (four) hours as needed for wheezing, Starting Tue 10/3/2017, Print      diclofenac sodium (VOLTAREN) 1 % Apply 2 g topically 4 (four) times a day, Starting Mon 10/1/2018, Normal      naproxen (EC NAPROSYN) 375 MG TBEC Take 1 tablet (375 mg total) by mouth 2 (two) times a day as needed for mild pain, Starting Mon 10/1/2018, Normal           No discharge procedures on file  ED Provider  Attending physically available and evaluated Rashid Terrell I managed the patient along with the ED Attending      Electronically Signed by         Kay Spring MD  10/04/18 8100

## 2019-01-23 ENCOUNTER — APPOINTMENT (OUTPATIENT)
Dept: LAB | Facility: CLINIC | Age: 64
End: 2019-01-23
Payer: COMMERCIAL

## 2019-01-23 ENCOUNTER — OFFICE VISIT (OUTPATIENT)
Dept: INTERNAL MEDICINE CLINIC | Facility: CLINIC | Age: 64
End: 2019-01-23

## 2019-01-23 VITALS
DIASTOLIC BLOOD PRESSURE: 98 MMHG | SYSTOLIC BLOOD PRESSURE: 132 MMHG | BODY MASS INDEX: 36.42 KG/M2 | HEART RATE: 76 BPM | TEMPERATURE: 97.7 F | HEIGHT: 68 IN | WEIGHT: 240.3 LBS

## 2019-01-23 DIAGNOSIS — R73.01 IMPAIRED FASTING GLUCOSE: ICD-10-CM

## 2019-01-23 DIAGNOSIS — Z23 NEED FOR DIPHTHERIA-TETANUS-PERTUSSIS (TDAP) VACCINE: ICD-10-CM

## 2019-01-23 DIAGNOSIS — R73.03 PREDIABETES: ICD-10-CM

## 2019-01-23 DIAGNOSIS — Z11.59 ENCOUNTER FOR HEPATITIS C SCREENING TEST FOR LOW RISK PATIENT: ICD-10-CM

## 2019-01-23 DIAGNOSIS — M25.569 ACUTE KNEE PAIN, UNSPECIFIED LATERALITY: ICD-10-CM

## 2019-01-23 DIAGNOSIS — E29.1 SECONDARY MALE HYPOGONADISM: ICD-10-CM

## 2019-01-23 DIAGNOSIS — Z91.89 AT RISK FOR OBSTRUCTIVE SLEEP APNEA: ICD-10-CM

## 2019-01-23 DIAGNOSIS — E66.9 OBESITY: ICD-10-CM

## 2019-01-23 DIAGNOSIS — R53.83 FATIGUE: ICD-10-CM

## 2019-01-23 DIAGNOSIS — R53.83 FATIGUE: Primary | ICD-10-CM

## 2019-01-23 DIAGNOSIS — F32.A DEPRESSION: ICD-10-CM

## 2019-01-23 DIAGNOSIS — Z00.00 HEALTHCARE MAINTENANCE: ICD-10-CM

## 2019-01-23 LAB
25(OH)D3 SERPL-MCNC: 18.5 NG/ML (ref 30–100)
ALBUMIN SERPL BCP-MCNC: 3.8 G/DL (ref 3.5–5)
ALP SERPL-CCNC: 60 U/L (ref 46–116)
ALT SERPL W P-5'-P-CCNC: 25 U/L (ref 12–78)
ANION GAP SERPL CALCULATED.3IONS-SCNC: 8 MMOL/L (ref 4–13)
AST SERPL W P-5'-P-CCNC: 13 U/L (ref 5–45)
BASOPHILS # BLD AUTO: 0.07 THOUSANDS/ΜL (ref 0–0.1)
BASOPHILS NFR BLD AUTO: 1 % (ref 0–1)
BILIRUB SERPL-MCNC: 0.69 MG/DL (ref 0.2–1)
BUN SERPL-MCNC: 21 MG/DL (ref 5–25)
CALCIUM SERPL-MCNC: 8.8 MG/DL (ref 8.3–10.1)
CHLORIDE SERPL-SCNC: 106 MMOL/L (ref 100–108)
CO2 SERPL-SCNC: 25 MMOL/L (ref 21–32)
CREAT SERPL-MCNC: 1.08 MG/DL (ref 0.6–1.3)
EOSINOPHIL # BLD AUTO: 0.29 THOUSAND/ΜL (ref 0–0.61)
EOSINOPHIL NFR BLD AUTO: 3 % (ref 0–6)
ERYTHROCYTE [DISTWIDTH] IN BLOOD BY AUTOMATED COUNT: 13.8 % (ref 11.6–15.1)
EST. AVERAGE GLUCOSE BLD GHB EST-MCNC: 134 MG/DL
FERRITIN SERPL-MCNC: 141 NG/ML (ref 8–388)
GFR SERPL CREATININE-BSD FRML MDRD: 72 ML/MIN/1.73SQ M
GLUCOSE P FAST SERPL-MCNC: 107 MG/DL (ref 65–99)
HBA1C MFR BLD: 6.3 % (ref 4.2–6.3)
HCT VFR BLD AUTO: 45.4 % (ref 36.5–49.3)
HCV AB SER QL: NORMAL
HGB BLD-MCNC: 14.8 G/DL (ref 12–17)
IMM GRANULOCYTES # BLD AUTO: 0.05 THOUSAND/UL (ref 0–0.2)
IMM GRANULOCYTES NFR BLD AUTO: 1 % (ref 0–2)
IRON SERPL-MCNC: 78 UG/DL (ref 65–175)
LYMPHOCYTES # BLD AUTO: 2.38 THOUSANDS/ΜL (ref 0.6–4.47)
LYMPHOCYTES NFR BLD AUTO: 26 % (ref 14–44)
MCH RBC QN AUTO: 30.3 PG (ref 26.8–34.3)
MCHC RBC AUTO-ENTMCNC: 32.6 G/DL (ref 31.4–37.4)
MCV RBC AUTO: 93 FL (ref 82–98)
MONOCYTES # BLD AUTO: 0.79 THOUSAND/ΜL (ref 0.17–1.22)
MONOCYTES NFR BLD AUTO: 9 % (ref 4–12)
NEUTROPHILS # BLD AUTO: 5.55 THOUSANDS/ΜL (ref 1.85–7.62)
NEUTS SEG NFR BLD AUTO: 60 % (ref 43–75)
NRBC BLD AUTO-RTO: 0 /100 WBCS
PLATELET # BLD AUTO: 323 THOUSANDS/UL (ref 149–390)
PMV BLD AUTO: 10.5 FL (ref 8.9–12.7)
POTASSIUM SERPL-SCNC: 3.6 MMOL/L (ref 3.5–5.3)
PROT SERPL-MCNC: 7.2 G/DL (ref 6.4–8.2)
RBC # BLD AUTO: 4.89 MILLION/UL (ref 3.88–5.62)
SODIUM SERPL-SCNC: 139 MMOL/L (ref 136–145)
TIBC SERPL-MCNC: 377 UG/DL (ref 250–450)
WBC # BLD AUTO: 9.13 THOUSAND/UL (ref 4.31–10.16)

## 2019-01-23 PROCEDURE — 83036 HEMOGLOBIN GLYCOSYLATED A1C: CPT

## 2019-01-23 PROCEDURE — 80053 COMPREHEN METABOLIC PANEL: CPT

## 2019-01-23 PROCEDURE — 83540 ASSAY OF IRON: CPT

## 2019-01-23 PROCEDURE — 85025 COMPLETE CBC W/AUTO DIFF WBC: CPT

## 2019-01-23 PROCEDURE — 90471 IMMUNIZATION ADMIN: CPT | Performed by: INTERNAL MEDICINE

## 2019-01-23 PROCEDURE — 36415 COLL VENOUS BLD VENIPUNCTURE: CPT

## 2019-01-23 PROCEDURE — 1036F TOBACCO NON-USER: CPT | Performed by: INTERNAL MEDICINE

## 2019-01-23 PROCEDURE — 83550 IRON BINDING TEST: CPT

## 2019-01-23 PROCEDURE — 82728 ASSAY OF FERRITIN: CPT

## 2019-01-23 PROCEDURE — 90715 TDAP VACCINE 7 YRS/> IM: CPT | Performed by: INTERNAL MEDICINE

## 2019-01-23 PROCEDURE — 82306 VITAMIN D 25 HYDROXY: CPT

## 2019-01-23 PROCEDURE — 3008F BODY MASS INDEX DOCD: CPT | Performed by: INTERNAL MEDICINE

## 2019-01-23 PROCEDURE — 99213 OFFICE O/P EST LOW 20 MIN: CPT | Performed by: INTERNAL MEDICINE

## 2019-01-23 PROCEDURE — 86803 HEPATITIS C AB TEST: CPT

## 2019-01-23 RX ORDER — FLUOXETINE 10 MG/1
10 CAPSULE ORAL DAILY
Qty: 60 CAPSULE | Refills: 0 | Status: SHIPPED | OUTPATIENT
Start: 2019-01-23 | End: 2019-03-21 | Stop reason: SDUPTHER

## 2019-01-23 NOTE — ASSESSMENT & PLAN NOTE
Lost to follow up work up showed low testosterone/ low-Normal FSH/LH  Normal prolactin  CBC did not reveal any anemia  Depression questions were positive for dysphoric mood/ lack of sleep/ decreased energy, however has good appetite  There is some social stressors- such as his mother living with him and problems between her and his other family members  · Will refer to endocrinology  · Obtain additional lab examinations

## 2019-01-23 NOTE — PROGRESS NOTES
ASSESSMENT/PLAN:  Problem List Items Addressed This Visit        Endocrine    Impaired fasting glucose     Hb A1C 5 9% in 1/2019  Denies polyphagia, polydipsia, polyuria  Will follow up in hb1ac and CMP this year  Secondary male hypogonadism     Patient had low testosterone with low limits of normal LH/FSH  Prolactin levels was normal   TSH was normal in addition to low energy, etc  · Prescribed testosterone patch once daily 30 day supply with 5 refills  · Obtain brain MRI  · Obtain a DEXA scan  · Will obtain iron panel with ferritin  · Referral for Endocrinology given  Relevant Medications    Testosterone 4 MG/24HR PT24    Other Relevant Orders    Ambulatory referral to Endocrinology    Iron, TIBC and Ferritin Panel    DXA bone density spine hip and pelvis    MRI brain wo contrast       Other    Depression     Admits to current symptoms of depression, however could also be multifactorial in the setting of hypogonadism (feeling fatigue etc)  Been prescribed multiple anti depressants however patient admits to never taking any prescribed medications such as sertraline and trazodone  Agreeable to starting Prozac  · Will start prozac and follow up in 4 weeks for improvement  Relevant Medications    FLUoxetine (PROzac) 10 mg capsule        Fatigue - Primary     Lost to follow up work up showed low testosterone/ low-Normal FSH/LH  Normal prolactin  CBC did not reveal any anemia  Depression questions were positive for dysphoric mood/ lack of sleep/ decreased energy, however has good appetite  There is some social stressors- such as his mother living with him and problems between her and his other family members  · Will refer to endocrinology  · Obtain additional lab examinations  Relevant Orders    CBC and differential    Vitamin D 25 hydroxy    Healthcare maintenance     Colon Ca Screening  · Colonoscopy- 10/2014   Due in 2024  Immunization  · Tdap- today  · Pneumonia- PCV13 first, followed by PPV23  · Flu-10/2018  Hepatitis C Screening slip given today  Acute knee pain     Stable  Improved with voltaren gel  Will refill naproxen  Continue naproxen  Relevant Medications    diclofenac sodium (VOLTAREN) 1 %    At risk for obstructive sleep apnea     STOP-BANG score performed  Patient meets points for snoring, however patient is unsure whether he has episodes of apnea and he is not being treated for high blood pressure  Objective measures are BMI is 36 5, age is 58, neck circumference is 15 5 inches, and gender male- patient is a high risk for LIBBY      · Will refer to sleep study and consult   · Counseled on weight loss         Relevant Orders    Ambulatory referral to Sleep Medicine      Other Visit Diagnoses     Need for diphtheria-tetanus-pertussis (Tdap) vaccine        Relevant Orders    TDAP VACCINE GREATER THAN OR EQUAL TO 8YO IM (Completed)    Encounter for hepatitis C screening test for low risk patient          Meets CDC criteria for screening  Relevant Orders    Hepatitis C antibody    Prediabetes      Screening for diabetes, given obesity and hgb1ac at 5 9% last year  Relevant Orders    Comprehensive metabolic panel    Hemoglobin A1C            CHIEF COMPLAINT: Fatigue    HISTORY OF PRESENT ILLNESS:  77-year-old male past medical history of depression, impaired fasting glucose presented for yearly evaluation  Patient states that there is more times and others when he feels fatigued usually during the summer he feels more active and rides bicycle for exercise  Otherwise he is more sedentary than normal    He describes his appetite is good and endorses mild weight gain  He also describes sleeping habits have been poor lately  Patient does also endorse dysphoric mood however does admit that he is not taking any of the prescribed medications such as sertraline or trazodone    Patient has also been under high stress secondary to social factors such as issues with his mother living with and causing drama among family members  The rest of ROS unremarkable  Review of Systems   Constitutional: Positive for fatigue  Negative for activity change, appetite change, chills and fever  Eyes: Negative for visual disturbance  Respiratory: Negative for cough, chest tightness and shortness of breath  Cardiovascular: Negative for chest pain and leg swelling  Gastrointestinal: Negative for abdominal pain, constipation, diarrhea, nausea and vomiting  Endocrine: Negative for cold intolerance, heat intolerance, polydipsia, polyphagia and polyuria  Genitourinary: Negative for decreased urine volume, difficulty urinating and urgency  Musculoskeletal: Negative for gait problem  Skin: Negative for rash  Allergic/Immunologic: Negative  Neurological: Negative  Negative for dizziness, weakness and light-headedness  Hematological: Negative  Psychiatric/Behavioral: Positive for dysphoric mood and sleep disturbance  The patient is not nervous/anxious  All other systems reviewed and are negative  OBJECTIVE:  Vitals:    01/23/19 1012   BP: 132/98   Pulse: 76   Temp: 97 7 °F (36 5 °C)   Weight: 109 kg (240 lb 4 8 oz)   Height: 5' 8" (1 727 m)     Physical Exam   Constitutional: He is oriented to person, place, and time  He appears well-developed and well-nourished  No distress  HENT:   Mouth/Throat: Oropharynx is clear and moist  No oropharyngeal exudate  Eyes: Conjunctivae are normal    Neck:   15 5 cm circumference   Cardiovascular: Normal rate, regular rhythm, normal heart sounds and intact distal pulses  Pulmonary/Chest: Effort normal and breath sounds normal  No respiratory distress  Abdominal: Soft  Bowel sounds are normal    Musculoskeletal: Normal range of motion  He exhibits no edema  Lymphadenopathy:     He has no cervical adenopathy  Neurological: He is alert and oriented to person, place, and time  No cranial nerve deficit  Coordination normal          Current Outpatient Prescriptions:     albuterol (PROVENTIL HFA,VENTOLIN HFA) 90 mcg/act inhaler, Inhale 2 puffs every 4 (four) hours as needed for wheezing, Disp: 1 Inhaler, Rfl: 0    diclofenac sodium (VOLTAREN) 1 %, Apply 2 g topically 4 (four) times a day, Disp: 100 g, Rfl: 1    naproxen (EC NAPROSYN) 375 MG TBEC, Take 1 tablet (375 mg total) by mouth 2 (two) times a day as needed for mild pain, Disp: 90 tablet, Rfl: 2    FLUoxetine (PROzac) 10 mg capsule, Take 1 capsule (10 mg total) by mouth daily, Disp: 60 capsule, Rfl: 0    Testosterone 4 MG/24HR PT24, Place 1 patch on the skin daily, Disp: 30 patch, Rfl: 5    Past Medical History:   Diagnosis Date    Asthma     Need for prophylactic vaccination and inoculation against influenza      Past Surgical History:   Procedure Laterality Date    HERNIA REPAIR Left     with mesh    SD REPAIR ING HERNIA,5+Y/O,REDUCIBL Right 4/3/2018    Procedure: REPAIR HERNIA INGUINAL;  Surgeon: Kevin Fulton MD;  [facility];  Service: General     Social History     Social History    Marital status: Single     Spouse name: N/A    Number of children: N/A    Years of education: N/A     Occupational History    Not on file  Social History Main Topics    Smoking status: Never Smoker    Smokeless tobacco: Never Used    Alcohol use Yes      Comment: socials   Drug use: No    Sexual activity: Not Currently     Other Topics Concern    Not on file     Social History Narrative    No narrative on file     Family History   Problem Relation Age of Onset    No Known Problems Family     Breast cancer Maternal CHUCKY Virk 72 Internal Medicine PGY-2  [facility]  [address]  [address]

## 2019-01-23 NOTE — ASSESSMENT & PLAN NOTE
Patient had low testosterone with low limits of normal LH/FSH  Prolactin levels was normal   TSH was normal in addition to low energy, etc  · Prescribed testosterone patch once daily 30 day supply with 5 refills  · Obtain brain MRI  · Obtain a DEXA scan  · Will obtain iron panel with ferritin  · Referral for Endocrinology given

## 2019-01-23 NOTE — ASSESSMENT & PLAN NOTE
Colon Ca Screening  · Colonoscopy- 10/2014  Due in 2024  Immunization  · Tdap- today  · Pneumonia- PCV13 first, followed by PPV23  · Flu-10/2018  Hepatitis C Screening slip given today

## 2019-01-23 NOTE — ASSESSMENT & PLAN NOTE
Hb A1C 5 9% in 1/2019  Denies polyphagia, polydipsia, polyuria  Will follow up in hb1ac and CMP this year

## 2019-01-23 NOTE — ASSESSMENT & PLAN NOTE
STOP-BANG score performed  Patient meets points for snoring, however patient is unsure whether he has episodes of apnea and he is not being treated for high blood pressure  Objective measures are BMI is 36 5, age is 58, neck circumference is 15 5 inches, and gender male- patient is a high risk for LIBBY       · Will refer to sleep study and consult   · Counseled on weight loss

## 2019-01-23 NOTE — ASSESSMENT & PLAN NOTE
Admits to current symptoms of depression, however could also be multifactorial in the setting of hypogonadism (feeling fatigue etc)  Been prescribed multiple anti depressants however patient admits to never taking any prescribed medications such as sertraline and trazodone  Agreeable to starting Prozac  · Will start prozac and follow up in 4 weeks for improvement, however suspect symptoms will improve after testosterone patch replacement

## 2019-02-15 ENCOUNTER — TELEPHONE (OUTPATIENT)
Dept: INTERNAL MEDICINE CLINIC | Facility: CLINIC | Age: 64
End: 2019-02-15

## 2019-02-15 NOTE — TELEPHONE ENCOUNTER
Is there a provider covering for Dr Lazaro Vila? This case is time sensitive, it will be permanently closed within 24-72 Hours  If they'd like to withdraw, I can inform patient of denial if need be but if they'd like to try to get approved, the window is closing for the peer to peer

## 2019-02-15 NOTE — TELEPHONE ENCOUNTER
Chris Silva  Please review and advise wether you are okay with doing the peer to peer or would you like us to wait for Dr Howard Mulligan? ? She will just have to reorder it again  Please Advise

## 2019-02-15 NOTE — TELEPHONE ENCOUNTER
Hi, I do not feel comfortable doing this prior auth for the MRI as I do not know the patient at all and have never examined him  I am not even sure if he is indicated for MR brainI as he does not have compressive symptoms (at least charted in Dr Anthony's note from last office visit) concerning for a macroadenoma that would be causing his hypogonadism  I will let Dr Shaylee Vicente address this when she returns    Thank you

## 2019-02-15 NOTE — TELEPHONE ENCOUNTER
This patient was scheduled for MRI OF BRAIN  The information I submitted was not enough to get it approved, so [facility] is requesting a [id number]  If you are able to do so, the number is below  If you wish to withdraw this request let us know so we can call central scheduling to cancel their upcoming appointment  YURI:  [phone] Option #3  Tracking Number: [id number]  Insurance: [facility]   ID#: [id number]     AUTH IS UNDER:   DR Trista Causey   NPI: [id number]    THIS CASE IS TIME SENSITIVE, THEY CLOSE THE CASE WITHIN 24-48 HRS  REASON FOR DENIAL:   We reviewed the medical information given by your doctor  Your doctors records  say you have low testosterone levels  With what was received from your doctor, a  decision was made that a history of fatigue is not a reason for a(n) Brain MRI       PLEASE LET ME KNOW AS SOON AS POSSIBLE, THANK YOU!!

## 2019-02-15 NOTE — TELEPHONE ENCOUNTER
I'm currently on vacation will be back on Wednesday so unfortunately I cannot do the peer to peer  Will need to reschedule or have someone else call, and if not once I'm back I can call  Thank you

## 2019-02-18 NOTE — TELEPHONE ENCOUNTER
OK, DR Miguel Seek CAN ADVISE WHEN SHE COMES BACK, LM FOR PATIENT TO CALL ME BACK TO INFORM OF DENIAL  TEST HAS BEEN CANCELLED [address] PRIOR TO BECAUSE IT WAS SCHEDULED FOR EARLY February

## 2019-02-18 NOTE — TELEPHONE ENCOUNTER
WAS ABLE TO GET IN CONTACT WITH PATIENT, HE UNDERSTANDS AND AGREES  DR Chico Gaston CAN REVIEW WHEN SHE COMES BACK   THANKS!!

## 2019-02-28 DIAGNOSIS — E55.9 VITAMIN D DEFICIENCY: Primary | ICD-10-CM

## 2019-02-28 RX ORDER — MELATONIN
1000 DAILY
Qty: 90 TABLET | Refills: 0 | Status: SHIPPED | OUTPATIENT
Start: 2019-02-28

## 2019-03-05 NOTE — TELEPHONE ENCOUNTER
Leonie Vasquez! Apparently you're PCP for this patient now  Myself and Dr Abraham Cardona were working him up for secondary hypogonadism and felt he needed an MRI  His lab results are normal but borderline almost- that's why Dr Sameera Owens felt an MRI would be appropriate because he doesn't have these obvious symptoms yet  However he's going to need a prior authorization because he doesn't have symptoms except for fatigue and no obvious neuro symptoms  Message me if you have additional question  Adk Dr Abraham Cardona opinion about this too  Most of his other labs have come back normal  He does have endo appointment but end of this year I would touch base with Dr Abraham Cardona sooner rather than later   Thank you

## 2019-03-21 DIAGNOSIS — F32.A DEPRESSION: ICD-10-CM

## 2019-03-21 RX ORDER — FLUOXETINE 10 MG/1
10 CAPSULE ORAL DAILY
Qty: 90 CAPSULE | Refills: 1 | Status: SHIPPED | OUTPATIENT
Start: 2019-03-21 | End: 2022-01-26 | Stop reason: ALTCHOICE

## 2019-04-22 ENCOUNTER — HOSPITAL ENCOUNTER (OUTPATIENT)
Dept: RADIOLOGY | Age: 64
Discharge: HOME | End: 2019-04-22
Payer: COMMERCIAL

## 2019-04-22 DIAGNOSIS — E29.1 SECONDARY MALE HYPOGONADISM: ICD-10-CM

## 2019-04-22 PROCEDURE — 77080 DXA BONE DENSITY AXIAL: CPT

## 2021-03-11 NOTE — ED ATTENDING ATTESTATION
Jeremias Coyle MD, saw and evaluated the patient  I have discussed the patient with the resident/non-physician practitioner and agree with the resident's/non-physician practitioner's findings, Plan of Care, and MDM as documented in the resident's/non-physician practitioner's note, except where noted  All available labs and Radiology studies were reviewed  At this point I agree with the current assessment done in the Emergency Department  I have conducted an independent evaluation of this patient a history and physical is as follows:    Patient presents to the emergency department that she complains of a 5 day history of sinus congestion, myalgias, a nonproductive cough, wheezing, and mild anterior chest pain that occurs only with coughing  The patient reports he gets episodes like this most years at this time of year  The patient denies any fevers chills or sweats  The patient has been using and out of date albuterol inhaler with some relief  The patient has recently been exposed to people with similar symptoms  Physical exam demonstrates a pleasant alert nontoxic male speaking in full sentences without difficulty  The patient is well-hydrated  HEENT exam was normal  Lungs had expiratory wheezes with a normal I to E ratio, no retractions or accessory muscle use  Heart had a regular rate and rhythm  The abdomen is soft and nontender  Extremities are symmetric and nontender without edema   Neurologic exam was normal     Critical Care Time  CritCare Time
Pt lives in a house with 3 MARGARET with a HR and 5 inside with a HR. Spouse and daughters will be available to assist as needed.

## 2021-07-24 ENCOUNTER — IMMUNIZATIONS (OUTPATIENT)
Dept: FAMILY MEDICINE CLINIC | Facility: HOSPITAL | Age: 66
End: 2021-07-24

## 2021-07-24 DIAGNOSIS — Z23 ENCOUNTER FOR IMMUNIZATION: Primary | ICD-10-CM

## 2021-07-24 PROCEDURE — 0001A SARS-COV-2 / COVID-19 MRNA VACCINE (PFIZER-BIONTECH) 30 MCG: CPT

## 2021-07-24 PROCEDURE — 91300 SARS-COV-2 / COVID-19 MRNA VACCINE (PFIZER-BIONTECH) 30 MCG: CPT

## 2021-08-14 ENCOUNTER — IMMUNIZATIONS (OUTPATIENT)
Dept: FAMILY MEDICINE CLINIC | Facility: HOSPITAL | Age: 66
End: 2021-08-14

## 2021-08-14 DIAGNOSIS — Z23 ENCOUNTER FOR IMMUNIZATION: Primary | ICD-10-CM

## 2021-08-14 PROCEDURE — 91300 SARS-COV-2 / COVID-19 MRNA VACCINE (PFIZER-BIONTECH) 30 MCG: CPT

## 2021-08-14 PROCEDURE — 0002A SARS-COV-2 / COVID-19 MRNA VACCINE (PFIZER-BIONTECH) 30 MCG: CPT

## 2021-08-16 ENCOUNTER — HOSPITAL ENCOUNTER (EMERGENCY)
Facility: HOSPITAL | Age: 66
Discharge: HOME | End: 2021-08-16
Attending: EMERGENCY MEDICINE
Payer: COMMERCIAL

## 2021-08-16 VITALS
DIASTOLIC BLOOD PRESSURE: 92 MMHG | HEART RATE: 77 BPM | OXYGEN SATURATION: 97 % | HEIGHT: 69 IN | SYSTOLIC BLOOD PRESSURE: 152 MMHG | BODY MASS INDEX: 39.99 KG/M2 | TEMPERATURE: 98.8 F | RESPIRATION RATE: 20 BRPM | WEIGHT: 270 LBS

## 2021-08-16 DIAGNOSIS — T50.Z95A ADVERSE EFFECT OF VACCINE, INITIAL ENCOUNTER: ICD-10-CM

## 2021-08-16 DIAGNOSIS — B34.9 VIRAL SYNDROME: Primary | ICD-10-CM

## 2021-08-16 LAB — SARS-COV-2 RNA RESP QL NAA+PROBE: NEGATIVE

## 2021-08-16 PROCEDURE — U0003 INFECTIOUS AGENT DETECTION BY NUCLEIC ACID (DNA OR RNA); SEVERE ACUTE RESPIRATORY SYNDROME CORONAVIRUS 2 (SARS-COV-2) (CORONAVIRUS DISEASE [COVID-19]), AMPLIFIED PROBE TECHNIQUE, MAKING USE OF HIGH THROUGHPUT TECHNOLOGIES AS DESCRIBED BY CMS-2020-01-R: HCPCS | Performed by: EMERGENCY MEDICINE

## 2021-08-16 PROCEDURE — U0005 INFEC AGEN DETEC AMPLI PROBE: HCPCS | Performed by: EMERGENCY MEDICINE

## 2021-08-16 PROCEDURE — 96372 THER/PROPH/DIAG INJ SC/IM: CPT

## 2021-08-16 PROCEDURE — 99283 EMERGENCY DEPT VISIT LOW MDM: CPT

## 2021-08-16 PROCEDURE — 99284 EMERGENCY DEPT VISIT MOD MDM: CPT | Performed by: EMERGENCY MEDICINE

## 2021-08-16 RX ORDER — KETOROLAC TROMETHAMINE 30 MG/ML
15 INJECTION, SOLUTION INTRAMUSCULAR; INTRAVENOUS ONCE
Status: COMPLETED | OUTPATIENT
Start: 2021-08-16 | End: 2021-08-16

## 2021-08-16 RX ORDER — ACETAMINOPHEN 325 MG/1
650 TABLET ORAL ONCE
Status: COMPLETED | OUTPATIENT
Start: 2021-08-16 | End: 2021-08-16

## 2021-08-16 RX ORDER — ONDANSETRON 4 MG/1
4 TABLET, ORALLY DISINTEGRATING ORAL ONCE
Status: COMPLETED | OUTPATIENT
Start: 2021-08-16 | End: 2021-08-16

## 2021-08-16 RX ADMIN — KETOROLAC TROMETHAMINE 15 MG: 30 INJECTION, SOLUTION INTRAMUSCULAR at 07:32

## 2021-08-16 RX ADMIN — ONDANSETRON 4 MG: 4 TABLET, ORALLY DISINTEGRATING ORAL at 07:36

## 2021-08-16 RX ADMIN — ACETAMINOPHEN 650 MG: 325 TABLET, FILM COATED ORAL at 07:32

## 2021-08-16 NOTE — ED ATTENDING ATTESTATION
8/16/2021  IKiko MD, saw and evaluated the patient  I have discussed the patient with the resident/non-physician practitioner and agree with the resident's/non-physician practitioner's findings, Plan of Care, and MDM as documented in the resident's/non-physician practitioner's note, except where noted  All available labs and Radiology studies were reviewed  I was present for key portions of any procedure(s) performed by the resident/non-physician practitioner and I was immediately available to provide assistance  At this point I agree with the current assessment done in the Emergency Department  I have conducted an independent evaluation of this patient a history and physical is as follows:    ED Course      Emergency Department Note- Alejo Hansen  78 y o  male MRN: [id number]    Unit/Bed#: ED 23 Encounter: [id number]    Christo Anand  is a 78 y o  male who presents with   Chief Complaint   Patient presents with    Generalized Body Aches     patient reports body aches, headache, decreased appetite for past 3 5 days  Reports getting second vaccine this past saturday         History of Present Illness   HPI:  Alejo Hansen  is a 78 y o  male who presents for evaluation of: Body aches, headaches, decreased appetite, difficulty sleepy  Patient just received his second covid vaccine last Saturday  Patient took naprosyn for the headaches, but the naprosyn gave him an upset stomach  Review of Systems   Constitutional: Positive for fatigue  Negative for fever  HENT: Negative for congestion and rhinorrhea  Respiratory: Negative for cough and shortness of breath  Cardiovascular: Negative for chest pain and palpitations  Gastrointestinal: Positive for nausea and vomiting (1 episode)  Neurological: Positive for headaches  All other systems reviewed and are negative        Historical Information   Past Medical History:   Diagnosis Date    Asthma     Need for prophylactic vaccination and inoculation against influenza      Past Surgical History:   Procedure Laterality Date    HERNIA REPAIR Left     with mesh    OR REPAIR ING HERNIA,5+Y/O,REDUCIBL Right 4/3/2018    Procedure: REPAIR HERNIA INGUINAL;  Surgeon: Jack Da Silva MD;  Location: [facility];  Service: General     Social History   Social History     Substance and Sexual Activity   Alcohol Use Yes    Comment: socials  Social History     Substance and Sexual Activity   Drug Use No     Social History     Tobacco Use   Smoking Status Never Smoker   Smokeless Tobacco Never Used     Family History:   Family History   Problem Relation Age of Onset    No Known Problems Family     Breast cancer Maternal Aunt        Meds/Allergies   PTA meds:   Prior to Admission Medications   Prescriptions Last Dose Informant Patient Reported? Taking?    FLUoxetine (PROzac) 10 mg capsule   No No   Sig: Take 1 capsule (10 mg total) by mouth daily   Testosterone 4 MG/24HR PT24   No No   Sig: Place 1 patch on the skin daily   albuterol (PROVENTIL HFA,VENTOLIN HFA) 90 mcg/act inhaler  Self No No   Sig: Inhale 2 puffs every 4 (four) hours as needed for wheezing   cholecalciferol (VITAMIN D3) 1,000 units tablet   No Yes   Sig: Take 1 tablet (1,000 Units total) by mouth daily   diclofenac sodium (VOLTAREN) 1 %   No No   Sig: Apply 2 g topically 4 (four) times a day   naproxen (EC NAPROSYN) 375 MG TBEC   No No   Sig: Take 1 tablet (375 mg total) by mouth 2 (two) times a day as needed for mild pain      Facility-Administered Medications: None     No Known Allergies    Objective   First Vitals:   Blood Pressure: (!) 175/122 (08/16/21 0626)  Pulse: 77 (08/16/21 0626)  Temperature: 98 8 °F (37 1 °C) (08/16/21 0626)  Temp Source: Oral (08/16/21 0626)  Respirations: 20 (08/16/21 0626)  Height: 5' 9" (175 3 cm) (08/16/21 0626)  Weight - Scale: 122 kg (270 lb) (08/16/21 0626)  SpO2: 97 % (08/16/21 0626)    Current Vitals:   Blood Pressure: (!) 175/122 (08/16/21 4283)  Pulse: 77 (21)  Temperature: 98 8 °F (37 1 °C) (21)  Temp Source: Oral (21)  Respirations: 20 (21)  Height: 5' 9" (175 3 cm) (21 4337)  Weight - Scale: 122 kg (270 lb) (21)  SpO2: 97 % (21)    No intake or output data in the 24 hours ending 21 0718    Invasive Devices     None                 Physical Exam  Vitals and nursing note reviewed  Constitutional:       General: He is not in acute distress  HENT:      Head: Normocephalic and atraumatic  Right Ear: External ear normal       Left Ear: External ear normal       Nose: Nose normal       Mouth/Throat:      Mouth: Mucous membranes are moist       Pharynx: Oropharynx is clear  Eyes:      Conjunctiva/sclera: Conjunctivae normal       Pupils: Pupils are equal, round, and reactive to light  Cardiovascular:      Rate and Rhythm: Normal rate and regular rhythm  Pulmonary:      Effort: Pulmonary effort is normal  No respiratory distress  Abdominal:      General: Bowel sounds are normal       Palpations: Abdomen is soft  Musculoskeletal:         General: No signs of injury  Normal range of motion  Cervical back: Normal range of motion and neck supple  Skin:     General: Skin is warm  Capillary Refill: Capillary refill takes less than 2 seconds  Neurological:      General: No focal deficit present  Mental Status: He is alert and oriented to person, place, and time  Mental status is at baseline  Coordination: Coordination normal    Psychiatric:         Mood and Affect: Mood normal          Behavior: Behavior normal          Thought Content: Thought content normal          Judgment: Judgment normal            Medical Decision Makin  Post covid vaccination fatigue, headaches, nausea: ketorolac    No results found for this or any previous visit (from the past 36 hour(s))    No orders to display         Portions of the record may have been created with voice recognition software  Occasional wrong word or "sound a like" substitutions may have occurred due to the inherent limitations of voice recognition software  Read the chart carefully and recognize, using context, where substitutions have occurred            Critical Care Time  Procedures

## 2021-08-16 NOTE — ED PROVIDER NOTES
History  Chief Complaint   Patient presents with    Generalized Body Aches     patient reports body aches, headache, decreased appetite for past 3 5 days  Reports getting second vaccine this past saturday     HPI  Patient 78year old male with history of asthma presenting with generalized muscle ache, headache, decreased appetite for the past 2 days  Headache primarily located at the top of his head and migrates down to the back of his ears  He also has mild lower back pain  No traumatic injury that he can recall  He states that the headache is sharp in nature and waxes and wane  When he takes a shower his headache feels much better  Muscle ache is all throughout his body  He has had loss of appetite and weakness since the symptoms started as well  He did report taking his second dose of pfizer vaccine 2 days ago and thinks that the symptom may have worsened since then  He is worried that he may have contracted COVID due to having multiple gatherings with friends and also visiting a family member at a nursing home recently  He also states his concern that he might be having a reaction to the second dose of the vaccine  He reports one episode of emesis that was non bloody and nonbilious  Denies any other symptoms  Prior to Admission Medications   Prescriptions Last Dose Informant Patient Reported? Taking?    FLUoxetine (PROzac) 10 mg capsule   No No   Sig: Take 1 capsule (10 mg total) by mouth daily   Testosterone 4 MG/24HR PT24   No No   Sig: Place 1 patch on the skin daily   albuterol (PROVENTIL HFA,VENTOLIN HFA) 90 mcg/act inhaler  Self No No   Sig: Inhale 2 puffs every 4 (four) hours as needed for wheezing   cholecalciferol (VITAMIN D3) 1,000 units tablet   No Yes   Sig: Take 1 tablet (1,000 Units total) by mouth daily   diclofenac sodium (VOLTAREN) 1 %   No No   Sig: Apply 2 g topically 4 (four) times a day   naproxen (EC NAPROSYN) 375 MG TBEC   No No   Sig: Take 1 tablet (375 mg total) by mouth 2 (two) times a day as needed for mild pain      Facility-Administered Medications: None       Past Medical History:   Diagnosis Date    Asthma     Need for prophylactic vaccination and inoculation against influenza        Past Surgical History:   Procedure Laterality Date    HERNIA REPAIR Left     with mesh    MI REPAIR ING HERNIA,5+Y/O,REDUCIBL Right 4/3/2018    Procedure: REPAIR HERNIA INGUINAL;  Surgeon: Santa Brandt MD;  Location: [facility];  Service: General       Family History   Problem Relation Age of Onset    No Known Problems Family     Breast cancer Maternal Aunt      I have reviewed and agree with the history as documented  E-Cigarette/Vaping     E-Cigarette/Vaping Substances     Social History     Tobacco Use    Smoking status: Never Smoker    Smokeless tobacco: Never Used   Substance Use Topics    Alcohol use: Yes     Comment: socials   Drug use: No        Review of Systems   Constitutional: Positive for appetite change and fatigue  Negative for chills, diaphoresis, fever and unexpected weight change  HENT: Negative for ear pain and sore throat  Eyes: Negative for visual disturbance  Respiratory: Negative for cough, chest tightness and shortness of breath  Cardiovascular: Negative for chest pain and leg swelling  Gastrointestinal: Positive for vomiting  Negative for abdominal distention, abdominal pain, constipation, diarrhea and nausea  Endocrine: Negative  Genitourinary: Negative for difficulty urinating and dysuria  Musculoskeletal: Positive for back pain (Low) and myalgias  Skin: Negative  Allergic/Immunologic: Negative  Neurological: Positive for headaches  Hematological: Negative  Psychiatric/Behavioral: Negative  All other systems reviewed and are negative        Physical Exam  ED Triage Vitals   Temperature Pulse Respirations Blood Pressure SpO2   08/16/21 0626 08/16/21 0626 08/16/21 0626 08/16/21 0626 08/16/21 0626   98 8 °F (37 1 °C) 77 20 (!) 175/122 97 %      Temp Source Heart Rate Source Patient Position - Orthostatic VS BP Location FiO2 (%)   08/16/21 0626 08/16/21 0626 -- 08/16/21 0801 --   Oral Monitor  Left arm       Pain Score       08/16/21 0626       Worst Possible Pain             Orthostatic Vital Signs  Vitals:    08/16/21 0626 08/16/21 0801   BP: (!) 175/122 152/92   Pulse: 77        Physical Exam  Vitals and nursing note reviewed  Constitutional:       General: He is not in acute distress  Appearance: Normal appearance  He is not ill-appearing  HENT:      Head: Normocephalic and atraumatic  Right Ear: External ear normal       Left Ear: External ear normal       Nose: Nose normal       Mouth/Throat:      Mouth: Mucous membranes are moist       Pharynx: Oropharynx is clear  Eyes:      General: No scleral icterus  Right eye: No discharge  Left eye: No discharge  Extraocular Movements: Extraocular movements intact  Conjunctiva/sclera: Conjunctivae normal       Pupils: Pupils are equal, round, and reactive to light  Cardiovascular:      Rate and Rhythm: Normal rate and regular rhythm  Pulses: Normal pulses  Heart sounds: Normal heart sounds  Pulmonary:      Effort: Pulmonary effort is normal       Breath sounds: Normal breath sounds  Abdominal:      General: Abdomen is flat  Bowel sounds are normal  There is no distension  Palpations: Abdomen is soft  Tenderness: There is no abdominal tenderness  There is no guarding or rebound  Musculoskeletal:         General: Normal range of motion  Cervical back: Normal range of motion and neck supple  Skin:     General: Skin is warm and dry  Capillary Refill: Capillary refill takes less than 2 seconds  Neurological:      General: No focal deficit present  Mental Status: He is alert and oriented to person, place, and time  Mental status is at baseline     Psychiatric:         Mood and Affect: Mood normal          Behavior: Behavior normal          Thought Content: Thought content normal          Judgment: Judgment normal          ED Medications  Medications   acetaminophen (TYLENOL) tablet 650 mg (650 mg Oral Given 8/16/21 0732)   ketorolac (TORADOL) injection 15 mg (15 mg Intramuscular Given 8/16/21 0732)   ondansetron (ZOFRAN-ODT) dispersible tablet 4 mg (4 mg Oral Given 8/16/21 0736)       Diagnostic Studies  Results Reviewed     Procedure Component Value Units Date/Time    Novel Coronavirus (Covid-19),PCR [facility] - 24 Hour Routine [[id number]] Collected: 08/16/21 0733    Lab Status: In process Specimen: Nares from Nose Updated: 08/16/21 0739                 No orders to display         Procedures  Procedures      ED Course                                       MDM  Number of Diagnoses or Management Options  Adverse effect of vaccine, initial encounter  Viral syndrome  Diagnosis management comments:    Patient 78year old male with generalized muscle ache, headache, fatigue, and decreased appetite for 2 days   Most likely viral syndrome vs reaction to vaccine   Patient with benign exam   Will order COVID test   Symptom relief with pain medication   Will discharge with instruction to follow up with PCP   Return precaution given     Disposition  Final diagnoses:   Viral syndrome   Adverse effect of vaccine, initial encounter     Time reflects when diagnosis was documented in both MDM as applicable and the Disposition within this note     Time User Action Codes Description Comment    8/16/2021  8:01 AM Ksihor Vanessa [B34 9] Viral syndrome     8/16/2021  8:04 AM Kishor Vanessa [T50  Z95A] Adverse effect of vaccine, initial encounter       ED Disposition     ED Disposition Condition Date/Time Comment    Discharge Stable Mon Aug 16, 2021  8:05 AM Roberto Pearce  discharge to home/self care              Follow-up Information     Follow up With Specialties Details Why Contact Info Additional [address], [address] [facility] Schedule an appointment as soon as possible for a visit   [address] [facility]  [address] [facility] Emergency Medicine Go to  If symptoms worsen [address]  [address] [facility] [address] [facility] Emergency Department, [address], [address] [phone]          Patient's Medications   Discharge Prescriptions    No medications on file     No discharge procedures on file  PDMP Review     None           ED Provider  Attending physically available and evaluated Genia Amezquita I managed the patient along with the ED Attending      Electronically Signed by         Cristel Saenz MD  08/16/21 2456

## 2021-08-16 NOTE — DISCHARGE INSTRUCTIONS
Please follow up with your primary care physician   If you develop worsening headache, numbness, one sided weakness, fever that is not controlled with medication or vision change please return to the ED

## 2021-08-16 NOTE — RESULT ENCOUNTER NOTE
Patient called at [phone]  Name and  used as positive patient identifiers  Made aware of negative test results

## 2021-08-17 ENCOUNTER — APPOINTMENT (EMERGENCY)
Dept: RADIOLOGY | Facility: HOSPITAL | Age: 66
End: 2021-08-17
Payer: COMMERCIAL

## 2021-08-17 ENCOUNTER — HOSPITAL ENCOUNTER (EMERGENCY)
Facility: HOSPITAL | Age: 66
Discharge: HOME | End: 2021-08-17
Attending: EMERGENCY MEDICINE | Admitting: EMERGENCY MEDICINE
Payer: COMMERCIAL

## 2021-08-17 VITALS
HEART RATE: 70 BPM | RESPIRATION RATE: 16 BRPM | OXYGEN SATURATION: 100 % | TEMPERATURE: 98.1 F | DIASTOLIC BLOOD PRESSURE: 101 MMHG | SYSTOLIC BLOOD PRESSURE: 190 MMHG

## 2021-08-17 DIAGNOSIS — T50.B95A MYALGIA AFTER COVID-19 VACCINATION: ICD-10-CM

## 2021-08-17 DIAGNOSIS — R11.0 NAUSEA: ICD-10-CM

## 2021-08-17 DIAGNOSIS — M79.10 MYALGIA AFTER COVID-19 VACCINATION: ICD-10-CM

## 2021-08-17 DIAGNOSIS — M25.569 ACUTE KNEE PAIN: ICD-10-CM

## 2021-08-17 DIAGNOSIS — R51.9 HEADACHE: Primary | ICD-10-CM

## 2021-08-17 LAB
ALBUMIN SERPL BCP-MCNC: 4 G/DL (ref 3.5–5)
ALP SERPL-CCNC: 73 U/L (ref 46–116)
ALT SERPL W P-5'-P-CCNC: 24 U/L (ref 12–78)
ANION GAP SERPL CALCULATED.3IONS-SCNC: 8 MMOL/L (ref 4–13)
AST SERPL W P-5'-P-CCNC: 15 U/L (ref 5–45)
ATRIAL RATE: 69 BPM
BASOPHILS # BLD AUTO: 0.11 THOUSANDS/ΜL (ref 0–0.1)
BASOPHILS NFR BLD AUTO: 1 % (ref 0–1)
BILIRUB SERPL-MCNC: 1.89 MG/DL (ref 0.2–1)
BUN SERPL-MCNC: 22 MG/DL (ref 5–25)
CALCIUM SERPL-MCNC: 9.8 MG/DL (ref 8.3–10.1)
CHLORIDE SERPL-SCNC: 101 MMOL/L (ref 100–108)
CO2 SERPL-SCNC: 27 MMOL/L (ref 21–32)
CREAT SERPL-MCNC: 1.21 MG/DL (ref 0.6–1.3)
EOSINOPHIL # BLD AUTO: 0.15 THOUSAND/ΜL (ref 0–0.61)
EOSINOPHIL NFR BLD AUTO: 1 % (ref 0–6)
ERYTHROCYTE [DISTWIDTH] IN BLOOD BY AUTOMATED COUNT: 14.6 % (ref 11.6–15.1)
GFR SERPL CREATININE-BSD FRML MDRD: 62 ML/MIN/1.73SQ M
GLUCOSE SERPL-MCNC: 112 MG/DL (ref 65–140)
HCT VFR BLD AUTO: 50 % (ref 36.5–49.3)
HGB BLD-MCNC: 16.9 G/DL (ref 12–17)
IMM GRANULOCYTES # BLD AUTO: 0.11 THOUSAND/UL (ref 0–0.2)
IMM GRANULOCYTES NFR BLD AUTO: 1 % (ref 0–2)
LYMPHOCYTES # BLD AUTO: 1.76 THOUSANDS/ΜL (ref 0.6–4.47)
LYMPHOCYTES NFR BLD AUTO: 11 % (ref 14–44)
MCH RBC QN AUTO: 31.4 PG (ref 26.8–34.3)
MCHC RBC AUTO-ENTMCNC: 33.8 G/DL (ref 31.4–37.4)
MCV RBC AUTO: 93 FL (ref 82–98)
MONOCYTES # BLD AUTO: 1.72 THOUSAND/ΜL (ref 0.17–1.22)
MONOCYTES NFR BLD AUTO: 10 % (ref 4–12)
NEUTROPHILS # BLD AUTO: 12.61 THOUSANDS/ΜL (ref 1.85–7.62)
NEUTS SEG NFR BLD AUTO: 76 % (ref 43–75)
NRBC BLD AUTO-RTO: 0 /100 WBCS
P AXIS: 53 DEGREES
PLATELET # BLD AUTO: 343 THOUSANDS/UL (ref 149–390)
PMV BLD AUTO: 9.8 FL (ref 8.9–12.7)
POTASSIUM SERPL-SCNC: 3.5 MMOL/L (ref 3.5–5.3)
PR INTERVAL: 154 MS
PROT SERPL-MCNC: 8.2 G/DL (ref 6.4–8.2)
QRS AXIS: -33 DEGREES
QRSD INTERVAL: 74 MS
QT INTERVAL: 394 MS
QTC INTERVAL: 422 MS
RBC # BLD AUTO: 5.39 MILLION/UL (ref 3.88–5.62)
SARS-COV-2 RNA RESP QL NAA+PROBE: NEGATIVE
SODIUM SERPL-SCNC: 136 MMOL/L (ref 136–145)
T WAVE AXIS: 41 DEGREES
TROPONIN I SERPL-MCNC: <0.02 NG/ML
VENTRICULAR RATE: 69 BPM
WBC # BLD AUTO: 16.46 THOUSAND/UL (ref 4.31–10.16)

## 2021-08-17 PROCEDURE — G1004 CDSM NDSC: HCPCS

## 2021-08-17 PROCEDURE — 99284 EMERGENCY DEPT VISIT MOD MDM: CPT

## 2021-08-17 PROCEDURE — 96374 THER/PROPH/DIAG INJ IV PUSH: CPT

## 2021-08-17 PROCEDURE — 64405 NJX AA&/STRD GR OCPL NRV: CPT | Performed by: EMERGENCY MEDICINE

## 2021-08-17 PROCEDURE — 93010 ELECTROCARDIOGRAM REPORT: CPT | Performed by: INTERNAL MEDICINE

## 2021-08-17 PROCEDURE — 85025 COMPLETE CBC W/AUTO DIFF WBC: CPT | Performed by: EMERGENCY MEDICINE

## 2021-08-17 PROCEDURE — 96361 HYDRATE IV INFUSION ADD-ON: CPT

## 2021-08-17 PROCEDURE — 70498 CT ANGIOGRAPHY NECK: CPT

## 2021-08-17 PROCEDURE — 70496 CT ANGIOGRAPHY HEAD: CPT

## 2021-08-17 PROCEDURE — 80053 COMPREHEN METABOLIC PANEL: CPT | Performed by: EMERGENCY MEDICINE

## 2021-08-17 PROCEDURE — 93005 ELECTROCARDIOGRAM TRACING: CPT

## 2021-08-17 PROCEDURE — U0003 INFECTIOUS AGENT DETECTION BY NUCLEIC ACID (DNA OR RNA); SEVERE ACUTE RESPIRATORY SYNDROME CORONAVIRUS 2 (SARS-COV-2) (CORONAVIRUS DISEASE [COVID-19]), AMPLIFIED PROBE TECHNIQUE, MAKING USE OF HIGH THROUGHPUT TECHNOLOGIES AS DESCRIBED BY CMS-2020-01-R: HCPCS | Performed by: EMERGENCY MEDICINE

## 2021-08-17 PROCEDURE — 84484 ASSAY OF TROPONIN QUANT: CPT | Performed by: EMERGENCY MEDICINE

## 2021-08-17 PROCEDURE — 99284 EMERGENCY DEPT VISIT MOD MDM: CPT | Performed by: EMERGENCY MEDICINE

## 2021-08-17 RX ORDER — LIDOCAINE HYDROCHLORIDE AND EPINEPHRINE 10; 10 MG/ML; UG/ML
10 INJECTION, SOLUTION INFILTRATION; PERINEURAL ONCE
Status: COMPLETED | OUTPATIENT
Start: 2021-08-17 | End: 2021-08-17

## 2021-08-17 RX ORDER — KETOROLAC TROMETHAMINE 30 MG/ML
15 INJECTION, SOLUTION INTRAMUSCULAR; INTRAVENOUS ONCE
Status: DISCONTINUED | OUTPATIENT
Start: 2021-08-17 | End: 2021-08-17 | Stop reason: HOSPADM

## 2021-08-17 RX ORDER — NAPROXEN 375 MG/1
375 TABLET, DELAYED RELEASE ORAL 2 TIMES DAILY PRN
Qty: 30 TABLET | Refills: 0 | Status: SHIPPED | OUTPATIENT
Start: 2021-08-17

## 2021-08-17 RX ORDER — ACETAMINOPHEN 325 MG/1
975 TABLET ORAL ONCE
Status: COMPLETED | OUTPATIENT
Start: 2021-08-17 | End: 2021-08-17

## 2021-08-17 RX ORDER — ONDANSETRON 4 MG/1
4 TABLET, ORALLY DISINTEGRATING ORAL EVERY 6 HOURS PRN
Qty: 20 TABLET | Refills: 0 | Status: SHIPPED | OUTPATIENT
Start: 2021-08-17 | End: 2022-01-26 | Stop reason: ALTCHOICE

## 2021-08-17 RX ORDER — METOCLOPRAMIDE HYDROCHLORIDE 5 MG/ML
10 INJECTION INTRAMUSCULAR; INTRAVENOUS ONCE
Status: COMPLETED | OUTPATIENT
Start: 2021-08-17 | End: 2021-08-17

## 2021-08-17 RX ADMIN — IOHEXOL 85 ML: 350 INJECTION, SOLUTION INTRAVENOUS at 16:22

## 2021-08-17 RX ADMIN — SODIUM CHLORIDE 1000 ML: 0.9 INJECTION, SOLUTION INTRAVENOUS at 15:24

## 2021-08-17 RX ADMIN — ACETAMINOPHEN 975 MG: 325 TABLET, FILM COATED ORAL at 15:18

## 2021-08-17 RX ADMIN — LIDOCAINE HYDROCHLORIDE,EPINEPHRINE BITARTRATE 10 ML: 10; .01 INJECTION, SOLUTION INFILTRATION; PERINEURAL at 15:44

## 2021-08-17 RX ADMIN — METOCLOPRAMIDE HYDROCHLORIDE 10 MG: 5 INJECTION INTRAMUSCULAR; INTRAVENOUS at 15:18

## 2021-08-17 NOTE — DISCHARGE INSTRUCTIONS
Patient Instructions: Today you were seen in the emergency department for headache  We reviewed your CT, EKG and your labs and determined that you would be able to be discharged  You should take ibuprofen and tylenol as needed for your pain   You should follow up with your primary care doctor  Please return to the emergency department if your symptoms get worse or you have any other symptoms we discussed today prior to discharge  Nice to meet you! Best of luck with everything!

## 2021-08-17 NOTE — ED PROVIDER NOTES
Final Diagnosis:  1  Headache    2  Nausea    3  Myalgia after COVID-19 vaccination    4  Acute knee pain      ED Course as of Aug 20 0040   Tue Aug 17, 2021   1704   No evidence of acute intracranial hemorrhage  2  No evidence of hemodynamic significant stenosis, aneurysm or dissection       CTA head and neck with and without contrast     Chief Complaint   Patient presents with    Back Pain     pt is compliaing of 10/10 back pain that he was seen in the ED for here 2 sdays ago  It has been unrelieved by otc medications and he is unable to sleep r eat due to the pain         Procedure  Nerve block    Date/Time: 8/17/2021 4:40 PM  Performed by: Rohan Villegas MD  Authorized by: Rohan Villegas MD     Indications:     Indications:  Pain relief  Location:     Body area:  Head    Head nerve:  Greater occipital    Laterality:  Bilateral  Procedure details (see MAR for exact dosages): Block needle gauge:  25 G    Anesthetic injected:  Lidocaine 1% WITH epi    Steroid injected:  None    Additive injected:  None    Injection procedure:  Anatomic landmarks identified, incremental injection, negative aspiration for blood, introduced needle and anatomic landmarks palpated  Post-procedure details:     Dressing:  None    Outcome:  Anesthesia achieved    Patient tolerance of procedure: Tolerated well, no immediate complications                  ASSESSMENT + PLAN:   - Nursing note reviewed  1  Headache/myalgias  -reassuring neurologic examination, no red flag  -possible dizziness, will obtain CTA  -symptomatic treatment  -patient feeling better after treatment, negative imaging, will discharge      Final Dispo   Patient was reassessed  Vital signs stable  Patient and/or family given discharge instructions and return precautions  Patient and/or family was reassured  The patient and/or family vocalizes understanding  Answered all of the patient's and/or family's questions  Will follow up with PCP   Patient and/or family are agreeable to the plan  Medications   sodium chloride 0 9 % bolus 1,000 mL (0 mL Intravenous Stopped 8/17/21 1721)   acetaminophen (TYLENOL) tablet 975 mg (975 mg Oral Given 8/17/21 1518)   lidocaine-epinephrine (XYLOCAINE/EPINEPHRINE) 1 %-1:100,000 injection 10 mL (10 mL Infiltration Given by Other 8/17/21 1544)   metoclopramide (REGLAN) injection 10 mg (10 mg Intravenous Given 8/17/21 1518)   iohexol (OMNIPAQUE) 350 MG/ML injection (SINGLE-DOSE) 100 mL (85 mL Intravenous Given 8/17/21 1622)     Time reflects when diagnosis was documented in both MDM as applicable and the Disposition within this note     Time User Action Codes Description Comment    8/17/2021  5:09 PM Dania Mounds Add [R11 0] Nausea     8/17/2021  5:09 PM Dania Mounds Add [R51 9] Headache     8/17/2021  5:09 PM Dania Mounds Modify [R11 0] Nausea     8/17/2021  5:09 PM Dania Mounds Modify [R51 9] Headache     8/17/2021  5:09 PM Dania Mounds Add [M79 10,  F07 X19E] Myalgia after COVID-19 vaccination     8/17/2021  6:15 PM Dania Mounds Add [M25 569] Acute knee pain     8/17/2021  6:15 PM Dania Mounds Modify [M25 569] Acute knee pain     8/17/2021  6:15 PM Dania Mounds Modify [R58 912] Acute knee pain       ED Disposition     ED Disposition Condition Date/Time Comment    Discharge Stable Tue Aug 17, 2021  5:10 PM Alejo Flowers  discharge to home/self care              Follow-up Information     Follow up With Specialties Details Why Contact Courtney Yanez DO Internal Medicine Call   [address]  [phone]          Discharge Medication List as of 8/17/2021  5:11 PM      START taking these medications    Details   ondansetron (ZOFRAN-ODT) 4 mg disintegrating tablet Take 1 tablet (4 mg total) by mouth every 6 (six) hours as needed for nausea or vomiting, Starting Tue 8/17/2021, Normal         CONTINUE these medications which have NOT CHANGED    Details   diclofenac sodium (VOLTAREN) 1 % Apply 2 g topically 4 (four) times a day, Starting Wed 1/23/2019, Normal      albuterol (PROVENTIL HFA,VENTOLIN HFA) 90 mcg/act inhaler Inhale 2 puffs every 4 (four) hours as needed for wheezing, Starting Tue 10/3/2017, Print      cholecalciferol (VITAMIN D3) 1,000 units tablet Take 1 tablet (1,000 Units total) by mouth daily, Starting Thu 2/28/2019, Normal      FLUoxetine (PROzac) 10 mg capsule Take 1 capsule (10 mg total) by mouth daily, Starting Thu 3/21/2019, Normal      Testosterone 4 MG/24HR PT24 Place 1 patch on the skin daily, Starting Wed 1/23/2019, Print      naproxen (EC NAPROSYN) 375 MG TBEC Take 1 tablet (375 mg total) by mouth 2 (two) times a day as needed for mild pain, Starting Mon 10/1/2018, Normal           No discharge procedures on file  Prior to Admission Medications   Prescriptions Last Dose Informant Patient Reported? Taking?    FLUoxetine (PROzac) 10 mg capsule Not Taking at Unknown time  No No   Sig: Take 1 capsule (10 mg total) by mouth daily   Patient not taking: Reported on 8/17/2021   Testosterone 4 MG/24HR PT24 Not Taking at Unknown time  No No   Sig: Place 1 patch on the skin daily   Patient not taking: Reported on 8/17/2021   albuterol (PROVENTIL HFA,VENTOLIN HFA) 90 mcg/act inhaler Not Taking at Unknown time Self No No   Sig: Inhale 2 puffs every 4 (four) hours as needed for wheezing   Patient not taking: Reported on 8/17/2021   cholecalciferol (VITAMIN D3) 1,000 units tablet Not Taking at Unknown time  No No   Sig: Take 1 tablet (1,000 Units total) by mouth daily   Patient not taking: Reported on 8/17/2021   diclofenac sodium (VOLTAREN) 1 % Past Week at Unknown time  No Yes   Sig: Apply 2 g topically 4 (four) times a day   naproxen (EC NAPROSYN) 375 MG TBEC Not Taking at Unknown time  No No   Sig: Take 1 tablet (375 mg total) by mouth 2 (two) times a day as needed for mild pain   Patient not taking: Reported on 8/17/2021      Facility-Administered Medications: None       History of Present Illness: This is a 78 y o  male presenting today for evaluation of neck pain that radiates to lower back  Pain comes and goes, but he seems to always has at least a little at baseline  Describes it as sharp/stabbing  Not positional  Has a headache that was not sudden or maximal in intensity  Has been going on for the past couple days  Does get better with tylenol  He has some associated lightheadedness and dizziness  He has some associated nausea and vomiting  He has had trouble tolerating PO  He also endorses generalized weakness, no focal weakness      - No language barrier    - History obtained from patient and chart   - There are no limitations to the history obtained  - Previous charting was reviewed  Some data reviewed included below for ease of access whether or not it is relevant to this patient encounter  Past Medical, Past Surgical History:    has a past medical history of Asthma and Need for prophylactic vaccination and inoculation against influenza  has a past surgical history that includes Hernia repair (Left) and pr repair ing hernia,5+y/o,reducibl (Right, 4/3/2018)  Allergies:   No Known Allergies    Social and Family History:     Social History     Substance and Sexual Activity   Alcohol Use Yes    Comment: socials  Social History     Tobacco Use   Smoking Status Never Smoker   Smokeless Tobacco Never Used     Social History     Substance and Sexual Activity   Drug Use No       Review of Systems:   Review of Systems   Constitutional: Negative for chills, diaphoresis and fever  HENT: Negative  Eyes: Negative  Negative for visual disturbance  Respiratory: Negative  Negative for shortness of breath  Cardiovascular: Negative  Negative for chest pain  Gastrointestinal: Negative  Negative for abdominal pain, nausea and vomiting  Endocrine: Negative  Genitourinary: Negative  Musculoskeletal: Positive for arthralgias and myalgias  Skin: Negative    Negative for rash    Allergic/Immunologic: Negative  Neurological: Positive for headaches  Negative for weakness, light-headedness and numbness  Hematological: Negative  Psychiatric/Behavioral: Negative  All other systems reviewed and are negative  Physical Examination     Vitals:    08/17/21 1515 08/17/21 1545 08/17/21 1657 08/17/21 1700   BP:    (!) 190/101   BP Location:       Pulse: 68 60  70   Resp: 20 17 16   Temp:   98 1 °F (36 7 °C)    TempSrc:       SpO2:    100%     Vitals reviewed by me  Physical Exam  Vitals and nursing note reviewed  Constitutional:       General: He is not in acute distress  Appearance: He is well-developed  He is not ill-appearing  Comments: Uncomfortable, but not toxic   HENT:      Head: Normocephalic and atraumatic  Right Ear: External ear normal       Left Ear: External ear normal    Eyes:      Extraocular Movements: Extraocular movements intact  Conjunctiva/sclera: Conjunctivae normal       Pupils: Pupils are equal, round, and reactive to light  Cardiovascular:      Rate and Rhythm: Normal rate  Pulmonary:      Effort: Pulmonary effort is normal       Breath sounds: Normal breath sounds  Abdominal:      General: There is no distension  Palpations: Abdomen is soft  Tenderness: There is no abdominal tenderness  Musculoskeletal:         General: Normal range of motion  Cervical back: Normal range of motion and neck supple  Skin:     General: Skin is warm and dry  Neurological:      General: No focal deficit present  Mental Status: He is alert and oriented to person, place, and time  Mental status is at baseline  Cranial Nerves: No cranial nerve deficit  Sensory: No sensory deficit  Motor: No weakness or abnormal muscle tone        Coordination: Coordination normal       Gait: Gait normal    Psychiatric:         Mood and Affect: Mood normal                            ED Course as of Aug 20 0040   Tue Aug 17, 2021 1704   No evidence of acute intracranial hemorrhage  2  No evidence of hemodynamic significant stenosis, aneurysm or dissection       CTA head and neck with and without contrast     CTA head and neck with and without contrast   Final Result         1  No evidence of acute intracranial hemorrhage  2  No evidence of hemodynamic significant stenosis, aneurysm or dissection  Workstation performed: [id number]           Orders Placed This Encounter   Procedures    Nerve block    Novel Coronavirus (Covid-19),PCR [facility]    CTA head and neck with and without contrast    Comprehensive metabolic panel    Troponin I    CBC and differential    EKG RESULTS    ECG 12 lead    ECG 12 lead       Labs:     Labs Reviewed   COMPREHENSIVE METABOLIC PANEL - Abnormal       Result Value Ref Range Status    Sodium 136  136 - 145 mmol/L Final    Potassium 3 5  3 5 - 5 3 mmol/L Final    Chloride 101  100 - 108 mmol/L Final    CO2 27  21 - 32 mmol/L Final    ANION GAP 8  4 - 13 mmol/L Final    BUN 22  5 - 25 mg/dL Final    Creatinine 1 21  0 60 - 1 30 mg/dL Final    Comment: Standardized to IDMS reference method    Glucose 112  65 - 140 mg/dL Final    Comment: If the patient is fasting, the ADA then defines impaired fasting glucose as > 100 mg/dL and diabetes as > or equal to 123 mg/dL  Specimen collection should occur prior to Sulfasalazine administration due to the potential for falsely depressed results  Specimen collection should occur prior to Sulfapyridine administration due to the potential for falsely elevated results  Calcium 9 8  8 3 - 10 1 mg/dL Final    AST 15  5 - 45 U/L Final    Comment: Specimen collection should occur prior to Sulfasalazine administration due to the potential for falsely depressed results  ALT 24  12 - 78 U/L Final    Comment: Specimen collection should occur prior to Sulfasalazine and/or Sulfapyridine administration due to the potential for falsely depressed results  Alkaline Phosphatase 73  46 - 116 U/L Final    Total Protein 8 2  6 4 - 8 2 g/dL Final    Albumin 4 0  3 5 - 5 0 g/dL Final    Total Bilirubin 1 89 (*) 0 20 - 1 00 mg/dL Final    Comment: Use of this assay is not recommended for patients undergoing treatment with eltrombopag due to the potential for falsely elevated results      eGFR 62  ml/min/1 73sq m Final    Narrative:     National Kid[employer] Disease [employer] guidelines for Chronic Kidney Disease (CKD):     Stage 1 with normal or high GFR (GFR > 90 mL/min/1 73 square meters)    Stage 2 Mild CKD (GFR = 60-89 mL/min/1 73 square meters)    Stage 3A Moderate CKD (GFR = 45-59 mL/min/1 73 square meters)    Stage 3B Moderate CKD (GFR = 30-44 mL/min/1 73 square meters)    Stage 4 Severe CKD (GFR = 15-29 mL/min/1 73 square meters)    Stage 5 End Stage CKD (GFR <15 mL/min/1 73 square meters)  Note: GFR calculation is accurate only with a steady state creatinine   CBC AND DIFFERENTIAL - Abnormal    WBC 16 46 (*) 4 31 - 10 16 Thousand/uL Final    RBC 5 39  3 88 - 5 62 Million/uL Final    Hemoglobin 16 9  12 0 - 17 0 g/dL Final    Hematocrit 50 0 (*) 36 5 - 49 3 % Final    MCV 93  82 - 98 fL Final    MCH 31 4  26 8 - 34 3 pg Final    MCHC 33 8  31 4 - 37 4 g/dL Final    RDW 14 6  11 6 - 15 1 % Final    MPV 9 8  8 9 - 12 7 fL Final    Platelets 560  447 - 390 Thousands/uL Final    nRBC 0  /100 WBCs Final    Neutrophils Relative 76 (*) 43 - 75 % Final    Immat GRANS % 1  0 - 2 % Final    Lymphocytes Relative 11 (*) 14 - 44 % Final    Monocytes Relative 10  4 - 12 % Final    Eosinophils Relative 1  0 - 6 % Final    Basophils Relative 1  0 - 1 % Final    Neutrophils Absolute 12 61 (*) 1 85 - 7 62 Thousands/µL Final    Immature Grans Absolute 0 11  0 00 - 0 20 Thousand/uL Final    Lymphocytes Absolute 1 76  0 60 - 4 47 Thousands/µL Final    Monocytes Absolute 1 72 (*) 0 17 - 1 22 Thousand/µL Final    Eosinophils Absolute 0 15  0 00 - 0 61 Thousand/µL Final    Basophils Absolute 0 11 (*) 0 00 - 0 10 Thousands/µL Final    Narrative: This is an appended report  These results have been appended to a previously verified report  NOVEL CORONAVIRUS (COVID-19), PCR [facility] - Normal    SARS-CoV-2 Negative  Negative Final    Comment:      Narrative:         TROPONIN I - Normal    Troponin I <0 02  <=0 04 ng/mL Final    Comment: Siemens Chemistry analyzer 99% cutoff is > 0 04 ng/mL in network labs     o cTnI 99% cutoff is useful only when applied to patients in the clinical setting of myocardial ischemia   o cTnI 99% cutoff should be interpreted in the context of clinical history, ECG findings and possibly cardiac imaging to establish correct diagnosis  o cTnI 99% cutoff may be suggestive but clearly not indicative of a coronary event without the clinical setting of myocardial ischemia  Imaging:   No results found  Final Diagnosis:  1  Headache    2  Nausea    3  Myalgia after COVID-19 vaccination    4  Acute knee pain        Code Status: No Order    Portions of the record may have been created with voice recognition software  Occasional wrong word or "sound a like" substitutions may have occurred due to the inherent limitations of voice recognition software  Read the chart carefully and recognize, using context, where substitutions have occurred      Electronically signed by:  Michelle Olivier, PGY 3, MD Nandini Atkins MD  08/20/21 [id number]

## 2021-08-17 NOTE — ED ATTENDING ATTESTATION
8/17/2021  I, Letta Kanner, MD, saw and evaluated the patient  I have discussed the patient with the resident/non-physician practitioner and agree with the resident's/non-physician practitioner's findings, Plan of Care, and MDM as documented in the resident's/non-physician practitioner's note, except where noted  All available labs and Radiology studies were reviewed  I was present for key portions of any procedure(s) performed by the resident/non-physician practitioner and I was immediately available to provide assistance  At this point I agree with the current assessment done in the Emergency Department    I have conducted an independent evaluation of this patient a history and physical is as follows:  Patient had is 2nd COVID vaccine on Saturday shortly thereafter he developed myalgias mild headache feeling fatigued and weak no loss of smell or taste no cough no abdominal pain does have a mild headache complains of pain in his neck he has no low back pain no weight loss  No known COVID exposure  Exam the patient is in no acute distress is awake alert oriented HEENT exam pupils equal round react to light extraocular muscles intact mucous membranes are moist neck is supple lungs clear heart regular no murmurs abdomen soft nontender extremities nontender skin no rash  Impression  Viral symptoms status post COVID vaccine  ED Course         Critical Care Time  Procedures

## 2021-10-08 ENCOUNTER — LAB (OUTPATIENT)
Dept: LAB | Facility: CLINIC | Age: 66
End: 2021-10-08
Payer: COMMERCIAL

## 2021-10-08 ENCOUNTER — OFFICE VISIT (OUTPATIENT)
Dept: INTERNAL MEDICINE CLINIC | Facility: CLINIC | Age: 66
End: 2021-10-08

## 2021-10-08 VITALS
BODY MASS INDEX: 35.15 KG/M2 | SYSTOLIC BLOOD PRESSURE: 149 MMHG | HEART RATE: 67 BPM | WEIGHT: 238 LBS | TEMPERATURE: 98 F | DIASTOLIC BLOOD PRESSURE: 96 MMHG | OXYGEN SATURATION: 95 %

## 2021-10-08 DIAGNOSIS — E55.9 VITAMIN D DEFICIENCY: ICD-10-CM

## 2021-10-08 DIAGNOSIS — I10 HYPERTENSION, UNSPECIFIED TYPE: ICD-10-CM

## 2021-10-08 DIAGNOSIS — R73.01 IMPAIRED FASTING GLUCOSE: ICD-10-CM

## 2021-10-08 DIAGNOSIS — E29.1 SECONDARY MALE HYPOGONADISM: ICD-10-CM

## 2021-10-08 DIAGNOSIS — R53.83 FATIGUE, UNSPECIFIED TYPE: ICD-10-CM

## 2021-10-08 DIAGNOSIS — E29.1 SECONDARY MALE HYPOGONADISM: Primary | ICD-10-CM

## 2021-10-08 LAB
25(OH)D3 SERPL-MCNC: 20.5 NG/ML (ref 30–100)
EST. AVERAGE GLUCOSE BLD GHB EST-MCNC: 123 MG/DL
FSH SERPL-ACNC: 5.6 MIU/ML (ref 0.7–10.8)
HBA1C MFR BLD: 5.9 %
LH SERPL-ACNC: 4 MIU/ML (ref 1.2–10.6)
PROLACTIN SERPL-MCNC: 10.3 NG/ML (ref 2.5–17.4)
TSH SERPL DL<=0.05 MIU/L-ACNC: 2.42 UIU/ML (ref 0.36–3.74)

## 2021-10-08 PROCEDURE — 84146 ASSAY OF PROLACTIN: CPT

## 2021-10-08 PROCEDURE — 84443 ASSAY THYROID STIM HORMONE: CPT

## 2021-10-08 PROCEDURE — 83001 ASSAY OF GONADOTROPIN (FSH): CPT

## 2021-10-08 PROCEDURE — 83036 HEMOGLOBIN GLYCOSYLATED A1C: CPT

## 2021-10-08 PROCEDURE — 82306 VITAMIN D 25 HYDROXY: CPT

## 2021-10-08 PROCEDURE — 84403 ASSAY OF TOTAL TESTOSTERONE: CPT

## 2021-10-08 PROCEDURE — 83002 ASSAY OF GONADOTROPIN (LH): CPT

## 2021-10-08 PROCEDURE — 99214 OFFICE O/P EST MOD 30 MIN: CPT | Performed by: INTERNAL MEDICINE

## 2021-10-08 PROCEDURE — 36415 COLL VENOUS BLD VENIPUNCTURE: CPT

## 2021-10-08 PROCEDURE — 84402 ASSAY OF FREE TESTOSTERONE: CPT

## 2021-10-08 RX ORDER — LISINOPRIL 10 MG/1
10 TABLET ORAL DAILY
Qty: 30 TABLET | Refills: 1 | Status: SHIPPED | OUTPATIENT
Start: 2021-10-08 | End: 2021-11-02

## 2021-10-09 LAB
TESTOST FREE SERPL-MCNC: 9.5 PG/ML (ref 6.6–18.1)
TESTOST SERPL-MCNC: 208 NG/DL (ref 264–916)

## 2021-11-02 DIAGNOSIS — I10 HYPERTENSION, UNSPECIFIED TYPE: ICD-10-CM

## 2021-11-02 RX ORDER — LISINOPRIL 10 MG/1
TABLET ORAL
Qty: 30 TABLET | Refills: 1 | Status: SHIPPED | OUTPATIENT
Start: 2021-11-02 | End: 2021-11-26

## 2022-01-26 ENCOUNTER — OFFICE VISIT (OUTPATIENT)
Dept: INTERNAL MEDICINE CLINIC | Facility: CLINIC | Age: 67
End: 2022-01-26

## 2022-01-26 ENCOUNTER — PATIENT OUTREACH (OUTPATIENT)
Dept: INTERNAL MEDICINE CLINIC | Facility: CLINIC | Age: 67
End: 2022-01-26

## 2022-01-26 VITALS
TEMPERATURE: 98 F | DIASTOLIC BLOOD PRESSURE: 91 MMHG | SYSTOLIC BLOOD PRESSURE: 137 MMHG | WEIGHT: 237 LBS | BODY MASS INDEX: 35 KG/M2 | OXYGEN SATURATION: 95 % | HEART RATE: 69 BPM

## 2022-01-26 DIAGNOSIS — I10 HYPERTENSION, UNSPECIFIED TYPE: Primary | ICD-10-CM

## 2022-01-26 DIAGNOSIS — R45.89 DEPRESSED MOOD: ICD-10-CM

## 2022-01-26 PROBLEM — E55.9 VITAMIN D DEFICIENCY: Status: ACTIVE | Noted: 2022-01-26

## 2022-01-26 PROCEDURE — 99213 OFFICE O/P EST LOW 20 MIN: CPT | Performed by: INTERNAL MEDICINE

## 2022-01-26 NOTE — PROGRESS NOTES
[facility]  INTERNAL MEDICINE OFFICE VISIT     PATIENT INFORMATION     Alejo Velez    78 y o  male   MRN: [id number]    ASSESSMENT/PLAN     Diagnoses and all orders for this visit:    Hypertension, unspecified type  BP in office today 137/91  Patient was started on lisinopril at his last visit  Tolerating the medication well without side effects  Patient not checking his blood pressure at home but is agreeable to checking every few days  · Will continue current dose of lisinopril 10 mg daily  · Asked patient to begin checking his blood pressure every few days and bring a log to our next visit    Depressed mood  Patient has history of depression and has been having depressed mood over the last few months in the setting of 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  He also cares for his sister  He has been on fluoxetine in the past  However, he is reluctant to restart medications and would like to explore other forms of therapy at this time  · Referral to Social Work Care Management program to have patient set up with behavioral health professional for therapy  -     Ambulatory Referral to [place] [address] Management Program; Future  -     Ambulatory Referral to [facility]; Future    Secondary male hypogonadism  Low testosterone, with inappropriately low normal FSH and LH  Patient taking testosterone patch  · Keep appointment with Endocrinology    Health maintenance  · Patient defers influenza vaccine at this time  · Patient is planning to get COVID vaccine booster    Schedule a follow-up appointment in 6 weeks for follow up BP and mood      HEALTH MAINTENANCE     Immunization History   Administered Date(s) Administered    COVID-19 PFIZER VACCINE 0 3 ML IM 07/24/2021, 08/14/2021    Influenza, recombinant, quadrivalent,injectable, preservative free 10/01/2018    Influenza, seasonal, injectable 10/25/2013    Influenza, seasonal, injectable, preservative free 01/24/2018    Tdap 01/23/2019     CHIEF COMPLAINT     Chief Complaint   Patient presents with    Follow-up     impaired fasting glucose and hypertension      HISTORY OF PRESENT ILLNESS     Nathaly Workman Sloane Arboleda is a 79 yo male with a past medical history of likely secondary hypogonadism, depression, asthma, hypertension who presents today for blood pressure recheck  Patient was started on lisinopril at our last visit in October  He has been tolerating the medication well with no side effects  He has not been checking his blood pressure at home since then but is agreeable to begin checking his blood pressure a few times a week  Patient has been experiencing some depressed mood and anxiety recently  He is the primary caregiver of his elderly mother with dementia and also cares for his sister  This has put a lot of strain on him on top of helping to take care of finances at home  He has been on fluoxetine in the past for depression but has not been taking this for years and has not felt like he needed it  He is reluctant to restart medication and would rather explore options for alternative therapy  To cope, he has been exercising and reading a lot  He was referred to Sleep Medicine for possible sleep apnea at his last visit  He has an appointment tomorrow  He was also referred to Endocrinology at his last visit for his history of low testosterone with inappropriately low normal levels of FSH and LH  He has been using the testosterone patch that was prescribed to him last time  He has an appointment with Endocrinology in 4 weeks  Unfortunately, patient has been having car troubles and suspects that he will need to reschedule these 2 appointments  REVIEW OF SYSTEMS     Review of Systems   Constitutional: Negative for chills, fatigue and fever  HENT: Negative for sore throat  Eyes: Negative for visual disturbance     Respiratory: Negative for cough and shortness of breath  Cardiovascular: Negative for chest pain, palpitations and leg swelling  Gastrointestinal: Negative for abdominal distention, abdominal pain, constipation, diarrhea and nausea  Genitourinary: Negative for dysuria  Musculoskeletal: Negative for arthralgias and myalgias  Neurological: Negative for dizziness, weakness, light-headedness and headaches  Psychiatric/Behavioral: Positive for dysphoric mood  OBJECTIVE     Vitals:    01/26/22 1412   BP: 137/91   BP Location: Left arm   Patient Position: Sitting   Cuff Size: Large   Pulse: 69   Temp: 98 °F (36 7 °C)   TempSrc: Temporal   SpO2: 95%   Weight: 108 kg (237 lb)     Physical Exam  Constitutional:       General: He is not in acute distress  Eyes:      Extraocular Movements: Extraocular movements intact  Conjunctiva/sclera: Conjunctivae normal    Neck:      Vascular: No JVD  Cardiovascular:      Rate and Rhythm: Normal rate  Heart sounds: Normal heart sounds  Pulmonary:      Effort: Pulmonary effort is normal       Breath sounds: Normal breath sounds  Abdominal:      General: There is no distension  Tenderness: There is no abdominal tenderness  Musculoskeletal:      Cervical back: Neck supple  Right lower leg: No swelling  Left lower leg: No swelling  Skin:     General: Skin is warm and dry  Neurological:      General: No focal deficit present  Mental Status: He is alert  Psychiatric:         Mood and Affect: Mood normal          Behavior: Behavior normal  Behavior is cooperative         CURRENT MEDICATIONS     Current Outpatient Medications:     albuterol (PROVENTIL HFA,VENTOLIN HFA) 90 mcg/act inhaler, Inhale 2 puffs every 4 (four) hours as needed for wheezing, Disp: 1 Inhaler, Rfl: 0    cholecalciferol (VITAMIN D3) 1,000 units tablet, Take 1 tablet (1,000 Units total) by mouth daily, Disp: 90 tablet, Rfl: 0    lisinopril (ZESTRIL) 10 mg tablet, TAKE 1 TABLET BY MOUTH EVERY DAY, Disp: 90 tablet, Rfl: 1    naproxen (EC NAPROSYN) 375 MG TBEC, Take 1 tablet (375 mg total) by mouth 2 (two) times a day as needed for mild pain, Disp: 30 tablet, Rfl: 0    Testosterone 4 MG/24HR PT24, Place 1 patch on the skin daily, Disp: 30 patch, Rfl: 5    FLUoxetine (PROzac) 10 mg capsule, Take 1 capsule (10 mg total) by mouth daily (Patient not taking: Reported on 1/26/2022 ), Disp: 90 capsule, Rfl: 1    ondansetron (ZOFRAN-ODT) 4 mg disintegrating tablet, Take 1 tablet (4 mg total) by mouth every 6 (six) hours as needed for nausea or vomiting (Patient not taking: Reported on 1/26/2022 ), Disp: 20 tablet, Rfl: 0    PAST MEDICAL & SURGICAL HISTORY     Past Medical History:   Diagnosis Date    Asthma     Need for prophylactic vaccination and inoculation against influenza      Past Surgical History:   Procedure Laterality Date    HERNIA REPAIR Left     with mesh    [place] REPAIR ING HERNIA,5+Y/O,REDUCIBL Right 4/3/2018    Procedure: REPAIR HERNIA INGUINAL;  Surgeon: Nae Villalba MD;  [facility];  Service: General     SOCIAL & FAMILY HISTORY     Social History     Socioeconomic History    Marital status: Single     Spouse name: Not on file    Number of children: Not on file    Years of education: Not on file    Highest education level: Not on file   Occupational History    Not on file   Tobacco Use    Smoking status: Never Smoker    Smokeless tobacco: Never Used   Vaping Use    Vaping Use: Never used   Substance and Sexual Activity    Alcohol use: Yes     Comment: socials       Drug use: No    Sexual activity: Not Currently   Other Topics Concern    Not on file   Social History Narrative    Not on file     Social Determinants of Health     Financial Resource Strain: Low Risk     Difficulty of Paying Living Expenses: Not hard at all   Food Insecurity: No Food Insecurity    Worried About Running Out of Food in the Last Year: Never true    Nia of Food in the Last Year: Never true   Transportation Needs: No Transportation Needs    Lack of Transportation (Medical): No    Lack of Transportation (Non-Medical): No   Physical Activity: Sufficiently Active    Days of Exercise per Week: 3 days    Minutes of Exercise per Session: 150+ min   Stress: No Stress Concern Present    Feeling of Stress : Only a little   Social Connections: Socially Isolated    Frequency of Communication with Friends and Family: Three times a week    Frequency of Social Gatherings with Friends and Family: Three times a week    Attends Christian Services: Never    Active Member of Clubs or Organizations: No    Attends Club or Organization Meetings: Never    Marital Status: Never    Intimate Partner Violence: Not At Risk    Fear of Current or Ex-Partner: No    Emotionally Abused: No    Physically Abused: No    Sexually Abused: No   Housing Stability: Low Risk     Unable to Pay for Housing in the Last Year: No    Number of [place] in the Last Year: 1    Unstable Housing in the Last Year: No     Social History     Substance and Sexual Activity   Alcohol Use Yes    Comment: socials  Social History     Substance and Sexual Activity   Drug Use No     Social History     Tobacco Use   Smoking Status Never Smoker   Smokeless Tobacco Never Used     Family History   Problem Relation Age of Onset    No Known Problems Family     Breast cancer Maternal Aunt              ==  Jesus Doss MD PGY1  [address] , [address] [address] [facility], [facility]  Office: [phone]  Fax: [phone]

## 2022-01-26 NOTE — PATIENT INSTRUCTIONS
Please check your blood pressure every few days and bring a record of your blood pressure to your next appointment

## 2022-01-26 NOTE — PROGRESS NOTES
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 who suffers from dementia with whom he resides  He is her Primary caregiver  He reports he has a sister who lives nearby but he needs to do most of the care as well as assisting this sister  SW has reviewed some programs that maybe able to help his Mother who is also a patient here  He will see if she is interested and  f/u with SW re same  JORGE also reviewed some housing options as pt has expressed that finances are tight  Pt did relate he was receptive to  [facility] and JORGE was able to provide a list of resources  At pt's request and with his permission JORGE called [facility] as it is very close to his home and he could walk there   They took pt's info and relates their INTAKE DEPT will return call to pt is within a few days to set up an appointment  Pt to f/u with SW if needed  JORGE also advised pt to update is new Insurance Coverage with the correct  PCP provider info  Pt to f/u on same  SW to remain available to assist as indicated  Please re-consult SW as needed

## 2022-02-12 ENCOUNTER — IMMUNIZATIONS (OUTPATIENT)
Dept: FAMILY MEDICINE CLINIC | Facility: HOSPITAL | Age: 67
End: 2022-02-12

## 2022-02-12 PROCEDURE — 91305 COVID-19 PFIZER VACC TRIS-SUCROSE GRAY CAP 0.3 ML: CPT

## 2022-02-12 PROCEDURE — 0051A COVID-19 PFIZER VACC TRIS-SUCROSE GRAY CAP 0.3 ML: CPT

## 2022-03-23 ENCOUNTER — PATIENT OUTREACH (OUTPATIENT)
Dept: INTERNAL MEDICINE CLINIC | Facility: CLINIC | Age: 67
End: 2022-03-23

## 2022-03-23 ENCOUNTER — CLINICAL SUPPORT (OUTPATIENT)
Dept: INTERNAL MEDICINE CLINIC | Facility: CLINIC | Age: 67
End: 2022-03-23

## 2022-03-23 ENCOUNTER — TELEPHONE (OUTPATIENT)
Dept: INTERNAL MEDICINE CLINIC | Facility: CLINIC | Age: 67
End: 2022-03-23

## 2022-03-23 VITALS — SYSTOLIC BLOOD PRESSURE: 132 MMHG | HEART RATE: 67 BPM | DIASTOLIC BLOOD PRESSURE: 88 MMHG

## 2022-03-23 DIAGNOSIS — R45.89 DEPRESSED MOOD: Primary | ICD-10-CM

## 2022-03-23 DIAGNOSIS — I10 HYPERTENSION, UNSPECIFIED TYPE: Primary | ICD-10-CM

## 2022-03-23 NOTE — PROGRESS NOTES
SW has met with pt as per his request to f/u with him on OP MH referral   Pt shares he has not been able to connect with [facility] as yet  With pt's permission SW has called them with pt to request a new appointment  Pt does share he is having a lot of anxiety and some depressed mood  No SI or HI noted  Pt also discussed concern he is still having with caring for his 79 y/o Mother who resides with him who suffers from dementia  Sw has reviewed Aging and PA Waiver services  His Mother is a pt in our Office and JORGE will reach out to see if pt may agree to the Waiver or Aging services  SW will remain available and assist as indicated

## 2022-03-23 NOTE — TELEPHONE ENCOUNTER
Patient came in today for a nurse visit for blood pressure check  Blood pressure today was as documented, 132/88 pulse 67  Patient has no symptoms at this time  When would you like to see patient back for nurse visit or for a follow up appt

## 2022-03-23 NOTE — PROGRESS NOTES
Patient came in today for a nurse visit for blood pressure check  Blood pressure today as documented, 132/88 pulse 67  Patient has no symptoms at this time

## 2022-03-28 ENCOUNTER — PATIENT OUTREACH (OUTPATIENT)
Dept: INTERNAL MEDICINE CLINIC | Facility: CLINIC | Age: 67
End: 2022-03-28

## 2022-03-28 NOTE — PROGRESS NOTES
JORGE received call from pt who was asking for help with f/u for his OP [address] appointment  Sw assisted with a call to [facility] who does have his contact info and their Intake Dept will follow up with pt in a few days  Pt also relates he has spoken to his Mother  PA Waiver Program and she has not answered him as yet  SW did inform him JORGE has spoke to the SWer at the [facility] who will also speak with pt re the Program and if she agrees will have her sign a ARSEN

## 2022-06-21 ENCOUNTER — PATIENT OUTREACH (OUTPATIENT)
Dept: INTERNAL MEDICINE CLINIC | Facility: CLINIC | Age: 67
End: 2022-06-21

## 2022-06-21 ENCOUNTER — OFFICE VISIT (OUTPATIENT)
Dept: INTERNAL MEDICINE CLINIC | Facility: CLINIC | Age: 67
End: 2022-06-21

## 2022-06-21 VITALS
HEART RATE: 65 BPM | BODY MASS INDEX: 35.92 KG/M2 | HEIGHT: 68 IN | SYSTOLIC BLOOD PRESSURE: 146 MMHG | DIASTOLIC BLOOD PRESSURE: 87 MMHG | TEMPERATURE: 98.1 F | WEIGHT: 237 LBS

## 2022-06-21 DIAGNOSIS — R45.89 DEPRESSED MOOD: Primary | ICD-10-CM

## 2022-06-21 DIAGNOSIS — I10 HYPERTENSION, UNSPECIFIED TYPE: ICD-10-CM

## 2022-06-21 DIAGNOSIS — E29.1 SECONDARY MALE HYPOGONADISM: ICD-10-CM

## 2022-06-21 PROCEDURE — 3079F DIAST BP 80-89 MM HG: CPT | Performed by: INTERNAL MEDICINE

## 2022-06-21 PROCEDURE — 3077F SYST BP >= 140 MM HG: CPT | Performed by: INTERNAL MEDICINE

## 2022-06-21 PROCEDURE — 3008F BODY MASS INDEX DOCD: CPT | Performed by: INTERNAL MEDICINE

## 2022-06-21 PROCEDURE — 1160F RVW MEDS BY RX/DR IN RCRD: CPT | Performed by: INTERNAL MEDICINE

## 2022-06-21 PROCEDURE — 99213 OFFICE O/P EST LOW 20 MIN: CPT | Performed by: INTERNAL MEDICINE

## 2022-06-21 PROCEDURE — 1036F TOBACCO NON-USER: CPT | Performed by: INTERNAL MEDICINE

## 2022-06-21 RX ORDER — LISINOPRIL 10 MG/1
10 TABLET ORAL DAILY
Qty: 90 TABLET | Refills: 1 | Status: SHIPPED | OUTPATIENT
Start: 2022-06-21

## 2022-06-21 RX ORDER — TRAZODONE HYDROCHLORIDE 50 MG/1
50 TABLET ORAL
Qty: 90 TABLET | Refills: 3 | Status: SHIPPED | OUTPATIENT
Start: 2022-06-21

## 2022-06-21 NOTE — PATIENT INSTRUCTIONS
Please continue taking Lisinopril 10 mg daily  Please take trazodone 50 mg daily before bed  Please follow up with social work and behavior health  Please continue with lifestyle modifications including proper diet and exercise

## 2022-06-21 NOTE — PROGRESS NOTES
[facility]  INTERNAL MEDICINE OFFICE VISIT     PATIENT INFORMATION     Ismael Cori Babinski    75 y o  male   MRN: [id number]    ASSESSMENT/PLAN     Diagnoses and all orders for this visit:    Depressed mood         - The patient reported depressed mood and increased stress at home due to taking care of his mother with Alzheimer's dementia  At his previous visit he was referred to Social Work Care Management and behavioral health to help set up professional therapy  Today he met with the  [employer] [facility] to discuss his current social situation  He also reported difficulty with sleep secondary to his stresses at home  Will start trazodone 50 mg daily at bedtime  He was also encouraged to continue following up with Behavioral Health and social work care management  -     traZODone (DESYREL) 50 mg tablet; Take 1 tablet (50 mg total) by mouth daily at bedtime     Hypertension, unspecified type        - The patient's blood pressure was elevated today at 146/87  He was prescribed lisinopril 10 mg daily, however he has not been taking it over the last several months  Will restart his lisinopril 10 mg daily and  the patient on the importance of taking this medication every day  He was also encouraged to obtain a blood pressure cuff and check his blood pressures at home and bring a log to his next visit  He was also counseled on lifestyle modifications including proper diet and exercise  -     lisinopril (ZESTRIL) 10 mg tablet; Take 1 tablet (10 mg total) by mouth daily    Schedule a follow-up appointment in 3 months      HEALTH MAINTENANCE     Immunization History   Administered Date(s) Administered    COVID-19 PFIZER VACCINE 0 3 ML IM 07/24/2021, 08/14/2021    COVID-19 Pfizer vac (Eddie-sucrose, gray cap) 12 yr+ IM 02/12/2022    Influenza, recombinant, quadrivalent,injectable, preservative free 10/01/2018    Influenza, seasonal, injectable 10/25/2013    Influenza, seasonal, injectable, preservative free 01/24/2018    Tdap 01/23/2019       BMI Counseling: Body mass index is 36 04 kg/m²  The BMI is above normal  Nutrition recommendations include reducing portion sizes, decreasing overall calorie intake, 3-5 servings of fruits/vegetables daily, reducing fast food intake and consuming healthier snacks  Exercise recommendations include moderate aerobic physical activity for 150 minutes/week and exercising 3-5 times per week  CHIEF COMPLAINT     Chief Complaint   Patient presents with    Follow-up     B/p      HISTORY OF PRESENT ILLNESS     The patient is a 58-year-old male with a past medical history of secondary hypogonadism, depression, asthma and hypertension who presented to the clinic today for a 3 month follow-up of mood and blood pressure  Overall today the patient was feeling well  He did complain of increased stress at home due to taking care of his mother who has Alzheimer's disease  At his previous visit on 01/26/2022 he was referred to [facility] [address] [place] [address] to help set up professional therapy  He was taking fluoxetine in the past however it was discontinued  He also reported difficulty with sleeping due to his stress at home  At today's visit his blood pressure was elevated at 146/87  He was prescribed lisinopril 10 mg daily, however he stated that he ran out several months ago and so he has not been taking it  He does continue to use the testosterone patch daily  Otherwise he had no other acute complaints  He denies any recent fevers, chills, chest pain, shortness of breath or abdominal pain  REVIEW OF SYSTEMS     Review of Systems   Constitutional: Negative for activity change, appetite change, chills, fatigue and fever  HENT: Negative for congestion, ear discharge, ear pain, hearing loss, sinus pain, sore throat, tinnitus and trouble swallowing  Eyes: Negative for pain and visual disturbance     Respiratory: Negative for cough, chest tightness, shortness of breath and wheezing  Cardiovascular: Negative for chest pain and leg swelling  Gastrointestinal: Negative for abdominal distention, abdominal pain, anal bleeding, blood in stool, constipation, diarrhea, nausea, rectal pain and vomiting  Endocrine: Negative for cold intolerance and heat intolerance  Genitourinary: Negative for difficulty urinating, dysuria and frequency  Musculoskeletal: Negative for arthralgias and myalgias  Skin: Negative for rash  Allergic/Immunologic: Negative for environmental allergies and food allergies  Neurological: Negative for dizziness, light-headedness, numbness and headaches  Hematological: Negative for adenopathy  Psychiatric/Behavioral: Positive for dysphoric mood  Negative for agitation, behavioral problems and confusion  OBJECTIVE     Vitals:    06/21/22 1251   BP: 146/87   BP Location: Left arm   Patient Position: Sitting   Cuff Size: Large   Pulse: 65   Temp: 98 1 °F (36 7 °C)   TempSrc: Temporal   Weight: 108 kg (237 lb)   Height: 5' 8" (1 727 m)     Physical Exam  Constitutional:       Appearance: He is well-developed  He is obese  HENT:      Head: Normocephalic and atraumatic  Nose: Nose normal    Eyes:      General:         Right eye: No discharge  Left eye: No discharge  Conjunctiva/sclera: Conjunctivae normal       Pupils: Pupils are equal, round, and reactive to light  Neck:      Thyroid: No thyromegaly  Cardiovascular:      Rate and Rhythm: Normal rate and regular rhythm  Heart sounds: Normal heart sounds  No murmur heard  No friction rub  No gallop  Pulmonary:      Effort: Pulmonary effort is normal  No respiratory distress  Breath sounds: Normal breath sounds  No stridor  No wheezing or rales  Chest:      Chest wall: No tenderness  Abdominal:      General: Bowel sounds are normal  There is no distension  Palpations: Abdomen is soft  There is no mass  Tenderness: There is no abdominal tenderness  There is no guarding or rebound  Musculoskeletal:         General: No tenderness or deformity  Right lower leg: No edema  Left lower leg: No edema  Lymphadenopathy:      Cervical: No cervical adenopathy  Skin:     General: Skin is warm and dry  Neurological:      Mental Status: He is alert and oriented to person, place, and time  Psychiatric:         Behavior: Behavior normal          Thought Content:  Thought content normal          Judgment: Judgment normal        CURRENT MEDICATIONS     Current Outpatient Medications:     albuterol (PROVENTIL HFA,VENTOLIN HFA) 90 mcg/act inhaler, Inhale 2 puffs every 4 (four) hours as needed for wheezing, Disp: 1 Inhaler, Rfl: 0    cholecalciferol (VITAMIN D3) 1,000 units tablet, Take 1 tablet (1,000 Units total) by mouth daily, Disp: 90 tablet, Rfl: 0    lisinopril (ZESTRIL) 10 mg tablet, Take 1 tablet (10 mg total) by mouth daily, Disp: 90 tablet, Rfl: 1    Testosterone 4 MG/24HR PT24, Place 1 patch on the skin daily, Disp: 30 patch, Rfl: 5    traZODone (DESYREL) 50 mg tablet, Take 1 tablet (50 mg total) by mouth daily at bedtime, Disp: 90 tablet, Rfl: 3    naproxen (EC NAPROSYN) 375 MG TBEC, Take 1 tablet (375 mg total) by mouth 2 (two) times a day as needed for mild pain (Patient not taking: Reported on 6/21/2022), Disp: 30 tablet, Rfl: 0    PAST MEDICAL & SURGICAL HISTORY     Past Medical History:   Diagnosis Date    Asthma     Need for prophylactic vaccination and inoculation against influenza      Past Surgical History:   Procedure Laterality Date    HERNIA REPAIR Left     with mesh    [place] REPAIR ING HERNIA,5+Y/O,REDUCIBL Right 4/3/2018    Procedure: REPAIR HERNIA INGUINAL;  Surgeon: Nargis Carranza MD;  Location: [facility];  Service: General     SOCIAL & FAMILY HISTORY     Social History     Socioeconomic History    Marital status: Single     Spouse name: Not on file    Number of children: Not on file    Years of education: Not on file    Highest education level: Not on file   Occupational History    Not on file   Tobacco Use    Smoking status: Never Smoker    Smokeless tobacco: Never Used   Vaping Use    Vaping Use: Never used   Substance and Sexual Activity    Alcohol use: Yes     Comment: socials   Drug use: No    Sexual activity: Not Currently   Other Topics Concern    Not on file   Social History Narrative    Not on file     Social Determinants of Health     Financial Resource Strain: Low Risk     Difficulty of Paying Living Expenses: Not hard at all   Food Insecurity: No Food Insecurity    Worried About Running Out of Food in the Last Year: Never true    Nia of Food in the Last Year: Never true   Transportation Needs: No Transportation Needs    Lack of Transportation (Medical): No    Lack of Transportation (Non-Medical): No   Physical Activity: Sufficiently Active    Days of Exercise per Week: 3 days    Minutes of Exercise per Session: 150+ min   Stress: No Stress Concern Present    Feeling of Stress : Only a little   Social Connections: Socially Isolated    Frequency of Communication with Friends and Family: Three times a week    Frequency of Social Gatherings with Friends and Family: Three times a week    Attends Adventism Services: Never    Active Member of Clubs or Organizations: No    Attends Club or Organization Meetings: Never    Marital Status: Never    Intimate Partner Violence: Not At Risk    Fear of Current or Ex-Partner: No    Emotionally Abused: No    Physically Abused: No    Sexually Abused: No   Housing Stability: Low Risk     Unable to Pay for Housing in the Last Year: No    Number of [place] in the Last Year: 1    Unstable Housing in the Last Year: No     Social History     Substance and Sexual Activity   Alcohol Use Yes    Comment: socials          Social History     Substance and Sexual Activity   Drug Use No     Social History Tobacco Use   Smoking Status Never Smoker   Smokeless Tobacco Never Used     Family History   Problem Relation Age of Onset    No Known Problems Family     Breast cancer Maternal Aunt      ==  Rima Wakefield MD  IM Resident, PGY-2  [facility] [address] - [place] , [address] [address] [facility], [facility]  Office: [phone]  Fax: [phone]

## 2022-06-21 NOTE — PROGRESS NOTES
JORGE has met with pt re f/u to assist f/u for his OP MH referral    SW has assisted pt with a call to [facility] an we have requested a n OP [address] appointment  SW to f/u with pt to see when scheduled  SW has also assisted pt with f/u re the concern he has for getting help for his Mother as he  is her 24 hour care provider  JORGE has assisted son with [facility] [phone]  They confirm Mother is medically approved for the Waiver   We also spoke to Aging Worker Clarissa Ozuna [phone] who shares the FED was done on 6/8/22 for his Mother  Son advised to look for mail from the [facility] office for pt  L application  JORGE has strongly encouraged pt to f/u on the PA Waiver application as he does need help in caring for his Mother  His sister helps to some degree but pt needs more supervision  Son if concerned that pt due to her dementia may not like others helping her at home  He is also knows she would not like to be in a Nursing Home  He does feel she may like a [facility]  Jorge did inform him that PA Waiver can help fund same  Support and encouragement provided  SW to f/u with pt and assist as indicated

## 2022-06-22 RX ORDER — TESTOSTERONE 4 MG/D
PATCH TRANSDERMAL
Qty: 30 PATCH | Refills: 2 | Status: SHIPPED | OUTPATIENT
Start: 2022-06-22

## 2022-06-22 NOTE — TELEPHONE ENCOUNTER
Last script given in Nov 2021 but was mentioned in yesterdays office visit that he is on it  PDMP checked

## 2022-08-30 ENCOUNTER — PATIENT OUTREACH (OUTPATIENT)
Dept: INTERNAL MEDICINE CLINIC | Facility: CLINIC | Age: 67
End: 2022-08-30

## 2022-08-30 NOTE — PROGRESS NOTES
Sw has spoken with pt on several occasions re resources for care of his Mother sn have provided OP MH resources  Pt aware he can f/u wit SW if needed  Please re-consult SW if needed

## 2022-09-28 ENCOUNTER — OFFICE VISIT (OUTPATIENT)
Dept: INTERNAL MEDICINE CLINIC | Facility: CLINIC | Age: 67
End: 2022-09-28

## 2022-09-28 VITALS
WEIGHT: 231.6 LBS | HEIGHT: 68 IN | DIASTOLIC BLOOD PRESSURE: 87 MMHG | OXYGEN SATURATION: 98 % | BODY MASS INDEX: 35.1 KG/M2 | HEART RATE: 72 BPM | TEMPERATURE: 98.6 F | SYSTOLIC BLOOD PRESSURE: 136 MMHG

## 2022-09-28 DIAGNOSIS — Z23 NEED FOR PNEUMOCOCCAL VACCINE: ICD-10-CM

## 2022-09-28 DIAGNOSIS — F32.9 REACTIVE DEPRESSION: Primary | ICD-10-CM

## 2022-09-28 DIAGNOSIS — I10 HYPERTENSION, UNSPECIFIED TYPE: ICD-10-CM

## 2022-09-28 PROCEDURE — 99213 OFFICE O/P EST LOW 20 MIN: CPT | Performed by: INTERNAL MEDICINE

## 2022-09-28 RX ORDER — SERTRALINE HYDROCHLORIDE 25 MG/1
50 TABLET, FILM COATED ORAL DAILY
Qty: 90 TABLET | Refills: 1 | Status: CANCELLED | OUTPATIENT
Start: 2022-09-28

## 2022-09-28 RX ORDER — ESCITALOPRAM OXALATE 10 MG/1
10 TABLET ORAL DAILY
Qty: 90 TABLET | Refills: 0 | Status: SHIPPED | OUTPATIENT
Start: 2022-09-28

## 2022-09-28 NOTE — PROGRESS NOTES
INTERNAL MEDICINE FOLLOW-UP OFFICE VISIT  [facility]   [address] [place], [place] [address]    NAME: Luis Lamb  AGE: 75 y o  SEX: male    DATE OF ENCOUNTER: 9/28/2022    Assessment and Plan     1  Reactive depression    Secondary to taking care of his mother with end stage dementia with behavioral disturbances  Endorse low mood, insomnia, difficulty with concentration, labile appetite    Denies any suicide or homicide ideation    Discuss starting him on SSRI which he is agreeable to  Will send lexapro 10mg qd  Take half tablet for a week and then a whole table  Discuss side effects and that effect can take upto 2-4 weeks  Will do a phone follow up in 2 weeks   - escitalopram (Lexapro) 10 mg tablet; Take 1 tablet (10 mg total) by mouth daily  Dispense: 90 tablet; Refill: 0  - Testosterone, free, total; Future    2  Need for pneumococcal vaccine    - Pneumococcal Conjugate Vaccine 20-valent (Pcv20)    3  Hypertension, unspecified type  Bp 136/87  Non-compliant with lisinopril  Discussed importance of compliance    4  Hx of Hypogonadism     Will check Free Testos levels       Orders Placed This Encounter   Procedures    Pneumococcal Conjugate Vaccine 20-valent (Pcv20)       - Counseling Documentation: patient was counseled regarding: risk factor reductions, patient and family education and risks and benefits of treatment options    Chief Complaint     Chief Complaint   Patient presents with    Follow-up    Diabetes    Depression       History of Present Illness     HPI     Pt is a 74 y/o w/ PMHx of Prediabetes, HTN, Insomnia who presents for a follow up  Pt was recent seen for low mood and HTN for which he was prescribed trazodone at night for better sleep and lisinopril  He endorses that medication do help but he has not been taking them regularly  He has been having difficult time managing his mother with Alzheimer's which is preventing to focus on himself   He has been having low mood and difficulty sleeping  He denies any suicide or homicide ideation  He denies any headaches, visual disturbances, chest pain, SOB, abdominal pain, nausea, vomiting, fever or chills     The following portions of the patient's history were reviewed and updated as appropriate: allergies, current medications, past family history, past medical history, past social history, past surgical history and problem list     Review of Systems     Review of Systems   Constitutional: Positive for fatigue  Negative for appetite change  HENT: Negative for congestion  Eyes: Negative for visual disturbance  Respiratory: Negative for cough and wheezing  Cardiovascular: Negative for chest pain, palpitations and leg swelling  Gastrointestinal: Negative for abdominal distention  Endocrine: Negative for polydipsia and polyuria  Genitourinary: Negative for decreased urine volume and dysuria  Neurological: Negative for weakness  Psychiatric/Behavioral: Positive for decreased concentration, dysphoric mood and sleep disturbance  Negative for self-injury and suicidal ideas  The patient is not nervous/anxious  Active Problem List     Patient Active Problem List   Diagnosis    Depression    Impaired fasting glucose    Right inguinal hernia    Obesity    Fatigue    Healthcare maintenance    Need for influenza vaccination    Acute knee pain    At risk for obstructive sleep apnea    Secondary male hypogonadism    Vitamin D deficiency    Hypertension       Objective     /87 (BP Location: Right arm, Patient Position: Sitting, Cuff Size: Standard)   Pulse 72   Temp 98 6 °F (37 °C) (Temporal)   Ht 5' 8" (1 727 m)   Wt 105 kg (231 lb 9 6 oz)   SpO2 98%   BMI 35 21 kg/m²     Physical Exam  Constitutional:       General: He is not in acute distress  Appearance: He is obese  He is not ill-appearing, toxic-appearing or diaphoretic  HENT:      Head: Normocephalic and atraumatic  Cardiovascular:      Rate and Rhythm: Normal rate and regular rhythm  Pulses: Normal pulses  Heart sounds: Normal heart sounds  No murmur heard  No friction rub  No gallop  Pulmonary:      Effort: Pulmonary effort is normal       Breath sounds: Normal breath sounds  No stridor  No rales  Chest:      Chest wall: No tenderness  Abdominal:      General: Bowel sounds are normal  There is no distension  Palpations: Abdomen is soft  There is no mass  Tenderness: There is no abdominal tenderness  There is no right CVA tenderness, left CVA tenderness or rebound  Hernia: No hernia is present  Musculoskeletal:      Right lower leg: No edema  Left lower leg: No edema  Neurological:      General: No focal deficit present  Mental Status: He is oriented to person, place, and time  Mental status is at baseline  Motor: No weakness  Psychiatric:         Behavior: Behavior normal          Pertinent Laboratory/Diagnostic Studies:  Chemistry Profile:   Lab Results   Component Value Date/Time    K 3 5 08/17/2021 04:00 PM     08/17/2021 04:00 PM    CO2 27 08/17/2021 04:00 PM    BUN 22 08/17/2021 04:00 PM    CREATININE 1 21 08/17/2021 04:00 PM    GLUC 112 08/17/2021 04:00 PM    GLUF 107 (H) 01/23/2019 11:39 AM    CALCIUM 9 8 08/17/2021 04:00 PM    AST 15 08/17/2021 04:00 PM    ALT 24 08/17/2021 04:00 PM    ALKPHOS 73 08/17/2021 04:00 PM    EGFR 62 08/17/2021 04:00 PM     CBC: No results for input(s): WBC, RBC, HGB, HCT, MCV, MCH, MCHC, RDW, MPV, PLT, NRBC, NEUTOPHILPCT, LYMPHOPCT, MONOPCT, EOSPCT, BASOPCT, NEUTROABS, LYMPHSABS, MONOSABS, EOSABS, MONOSABS in the last 8784 hours      Current Medications     Current Outpatient Medications:     albuterol (PROVENTIL HFA,VENTOLIN HFA) 90 mcg/act inhaler, Inhale 2 puffs every 4 (four) hours as needed for wheezing, Disp: 1 Inhaler, Rfl: 0    cholecalciferol (VITAMIN D3) 1,000 units tablet, Take 1 tablet (1,000 Units total) by mouth daily, Disp: 90 tablet, Rfl: 0    Androderm 4 MG/24HR PT24, APPLY 1 PATCH EVERY DAY (Patient not taking: Reported on 9/28/2022), Disp: 30 patch, Rfl: 2    lisinopril (ZESTRIL) 10 mg tablet, Take 1 tablet (10 mg total) by mouth daily (Patient not taking: Reported on 9/28/2022), Disp: 90 tablet, Rfl: 1    naproxen (EC NAPROSYN) 375 MG TBEC, Take 1 tablet (375 mg total) by mouth 2 (two) times a day as needed for mild pain (Patient not taking: No sig reported), Disp: 30 tablet, Rfl: 0    traZODone (DESYREL) 50 mg tablet, Take 1 tablet (50 mg total) by mouth daily at bedtime (Patient not taking: Reported on 9/28/2022), Disp: 90 tablet, Rfl: 3    Health Maintenance     Health Maintenance   Topic Date Due    Medicare Annual Wellness Visit (AWV)  Never done    Pneumococcal Vaccine: 65+ Years (1 - PCV) Never done    Influenza Vaccine (1) 09/01/2022    Fall Risk  10/08/2022    COVID-19 Vaccine (4 - Booster for Steele Peter series) 12/28/2022 (Originally 6/12/2022)    BMI: Followup Plan  06/21/2023    BMI: Adult  09/28/2023    Colorectal Cancer Screening  10/08/2024    Hepatitis C Screening  Completed    HIB Vaccine  Aged Out    Hepatitis B Vaccine  Aged Out    IPV Vaccine  Aged Out    Hepatitis A Vaccine  Aged Out    Meningococcal ACWY Vaccine  Aged Out    HPV Vaccine  Aged Out     Immunization History   Administered Date(s) Administered    COVID-19 PFIZER VACCINE 0 3 ML IM 07/24/2021, 08/14/2021    COVID-19 Pfizer vac (Eddie-sucrose, gray cap) 12 yr+ IM 02/12/2022    Influenza, recombinant, quadrivalent,injectable, preservative free 10/01/2018    Influenza, seasonal, injectable 10/25/2013    Influenza, seasonal, injectable, preservative free 01/24/2018    Tdap 01/23/2019       Felix Abreu MD  PGY 3

## 2022-11-21 ENCOUNTER — OFFICE VISIT (OUTPATIENT)
Dept: INTERNAL MEDICINE CLINIC | Facility: CLINIC | Age: 67
End: 2022-11-21

## 2022-11-21 VITALS
HEART RATE: 71 BPM | HEIGHT: 68 IN | TEMPERATURE: 98.2 F | DIASTOLIC BLOOD PRESSURE: 87 MMHG | BODY MASS INDEX: 35.01 KG/M2 | WEIGHT: 231 LBS | SYSTOLIC BLOOD PRESSURE: 129 MMHG

## 2022-11-21 DIAGNOSIS — R73.01 IMPAIRED FASTING GLUCOSE: ICD-10-CM

## 2022-11-21 DIAGNOSIS — Z13.29 SCREENING FOR THYROID DISORDER: ICD-10-CM

## 2022-11-21 DIAGNOSIS — Z13.6 ENCOUNTER FOR LIPID SCREENING FOR CARDIOVASCULAR DISEASE: ICD-10-CM

## 2022-11-21 DIAGNOSIS — E55.9 VITAMIN D DEFICIENCY: ICD-10-CM

## 2022-11-21 DIAGNOSIS — I10 PRIMARY HYPERTENSION: Primary | ICD-10-CM

## 2022-11-21 DIAGNOSIS — Z23 NEED FOR INFLUENZA VACCINATION: ICD-10-CM

## 2022-11-21 DIAGNOSIS — Z23 NEED FOR PNEUMOCOCCAL VACCINATION: ICD-10-CM

## 2022-11-21 DIAGNOSIS — Z13.220 ENCOUNTER FOR LIPID SCREENING FOR CARDIOVASCULAR DISEASE: ICD-10-CM

## 2022-11-21 PROBLEM — E66.9 OBESITY: Status: RESOLVED | Noted: 2017-05-23 | Resolved: 2022-11-21

## 2022-11-21 PROBLEM — Z91.89 AT RISK FOR OBSTRUCTIVE SLEEP APNEA: Status: RESOLVED | Noted: 2019-01-23 | Resolved: 2022-11-21

## 2022-11-21 PROBLEM — M25.569 ACUTE KNEE PAIN: Status: RESOLVED | Noted: 2018-10-01 | Resolved: 2022-11-21

## 2022-11-21 PROBLEM — R53.83 FATIGUE: Status: RESOLVED | Noted: 2018-01-24 | Resolved: 2022-11-21

## 2022-11-21 PROBLEM — Z00.00 HEALTHCARE MAINTENANCE: Status: RESOLVED | Noted: 2018-01-24 | Resolved: 2022-11-21

## 2022-11-21 NOTE — ASSESSMENT & PLAN NOTE
Lab Results   Component Value Date    HGBA1C 5 9 (H) 10/08/2021     Due for recheck  See BMI counseling for nutrition and exercise recommendations

## 2022-11-21 NOTE — PROGRESS NOTES
Name: Robb Schofield  : 1955      MRN: [id number]  Encounter Provider: Bubba Crystal PA-C  Encounter Date: 2022   Encounter department: [facility]     1  Primary hypertension  Assessment & Plan:  Controlled off of medications  Limit sodium and salt intake  Avoid alcohol and caffeine  Recheck 3 months  Orders:  -     CBC and differential; Future    2  Impaired fasting glucose  Assessment & Plan:  Lab Results   Component Value Date    HGBA1C 5 9 (H) 10/08/2021     Due for recheck  See BMI counseling for nutrition and exercise recommendations  Orders:  -     Comprehensive metabolic panel; Future  -     HEMOGLOBIN A1C W/ EAG ESTIMATION; Future    3  Vitamin D deficiency  Assessment & Plan:  Due for recheck  Orders:  -     Vitamin D 25 hydroxy; Future    4  BMI 35 0-35 9,adult  Assessment & Plan:  See counseling  5  Need for influenza vaccination  Assessment & Plan:  Received influenza and Prevnar vaccinations today  Patient tolerated well  Orders:  -     influenza vaccine, high-dose, PF 0 7 mL (FLUZONE HIGH-DOSE)    6  Need for pneumococcal vaccination  Assessment & Plan:  Received influenza and Prevnar vaccinations today  Patient tolerated well  Orders:  -     Pneumococcal Conjugate Vaccine 20-valent (Pcv20)    7  Screening for thyroid disorder  -     TSH, 3rd generation with Free T4 reflex; Future    8  Encounter for lipid screening for cardiovascular disease  -     Lipid Panel with Direct LDL reflex; Future    BMI Counseling: Body mass index is 35 12 kg/m²   The BMI is above normal  Nutrition recommendations include decreasing portion sizes, encouraging healthy choices of fruits and vegetables, decreasing fast food intake, consuming healthier snacks, limiting drinks that contain sugar, moderation in carbohydrate intake, increasing intake of lean protein, reducing intake of saturated and trans fat and reducing intake of cholesterol  Exercise recommendations include moderate physical activity 150 minutes/week, exercising 3-5 times per week and strength training exercises  No pharmacotherapy was ordered  Rationale for BMI follow-up plan is due to patient being overweight or obese  Subjective      Patient is a 75year old male with a PMH of hypertension presenting for follow up on blood pressure  He has no concerns at this time  Review of Systems   Constitutional: Negative for appetite change, chills, diaphoresis, fatigue, fever and unexpected weight change  HENT: Negative for congestion, dental problem, hearing loss, sore throat, tinnitus and trouble swallowing  Eyes: Negative for visual disturbance  Respiratory: Negative for cough, chest tightness, shortness of breath and wheezing  Cardiovascular: Negative for chest pain, palpitations and leg swelling  Gastrointestinal: Negative for abdominal pain, blood in stool, constipation, diarrhea, nausea and vomiting  Endocrine: Negative for cold intolerance, heat intolerance, polydipsia, polyphagia and polyuria  Musculoskeletal: Negative for arthralgias and myalgias  Skin: Negative for rash  Neurological: Negative for dizziness, tremors, weakness, light-headedness and headaches  Hematological: Negative for adenopathy  Psychiatric/Behavioral: Positive for dysphoric mood (due to a situation with his mother, for which he is in the process of getting help)  Negative for self-injury, sleep disturbance and suicidal ideas  The patient is not nervous/anxious          Current Outpatient Medications on File Prior to Visit   Medication Sig   • albuterol (PROVENTIL HFA,VENTOLIN HFA) 90 mcg/act inhaler Inhale 2 puffs every 4 (four) hours as needed for wheezing   • Androderm 4 MG/24HR PT24 APPLY 1 PATCH EVERY DAY   • cholecalciferol (VITAMIN D3) 1,000 units tablet Take 1 tablet (1,000 Units total) by mouth daily   • [DISCONTINUED] escitalopram (Lexapro) 10 mg tablet Take 1 tablet (10 mg total) by mouth daily (Patient not taking: Reported on 11/21/2022)   • [DISCONTINUED] lisinopril (ZESTRIL) 10 mg tablet Take 1 tablet (10 mg total) by mouth daily (Patient not taking: Reported on 9/28/2022)   • [DISCONTINUED] traZODone (DESYREL) 50 mg tablet Take 1 tablet (50 mg total) by mouth daily at bedtime (Patient not taking: Reported on 9/28/2022)       Objective     /87 (BP Location: Left arm, Patient Position: Sitting, Cuff Size: Large)   Pulse 71   Temp 98 2 °F (36 8 °C) (Temporal)   Ht 5' 8" (1 727 m)   Wt 105 kg (231 lb)   BMI 35 12 kg/m²     Physical Exam  Vitals and nursing note reviewed  Constitutional:       General: He is awake  He is not in acute distress  Appearance: Normal appearance  He is well-developed and well-groomed  He is obese  He is not ill-appearing  HENT:      Head: Normocephalic and atraumatic  Eyes:      General: No scleral icterus  Conjunctiva/sclera: Conjunctivae normal    Cardiovascular:      Rate and Rhythm: Normal rate and regular rhythm  Pulses: Normal pulses  Heart sounds: Normal heart sounds  No murmur heard  Pulmonary:      Effort: Pulmonary effort is normal  No respiratory distress  Breath sounds: Normal breath sounds and air entry  No decreased air movement  No decreased breath sounds, wheezing, rhonchi or rales  Abdominal:      General: Abdomen is flat  Bowel sounds are normal  There is no distension  Palpations: Abdomen is soft  There is no mass  Tenderness: There is no abdominal tenderness  There is no right CVA tenderness, left CVA tenderness, guarding or rebound  Hernia: No hernia is present  Musculoskeletal:         General: Normal range of motion  Cervical back: Neck supple  Right lower leg: No edema  Left lower leg: No edema  Lymphadenopathy:      Cervical: No cervical adenopathy  Skin:     General: Skin is warm        Capillary Refill: Capillary refill takes less than 2 seconds  Coloration: Skin is not jaundiced  Findings: No rash  Neurological:      General: No focal deficit present  Mental Status: He is alert and oriented to person, place, and time  Mental status is at baseline  Sensory: Sensation is intact  Motor: Motor function is intact  Coordination: Coordination is intact  Gait: Gait is intact  Psychiatric:         Attention and Perception: Attention and perception normal          Mood and Affect: Mood and affect normal          Speech: Speech normal          Behavior: Behavior normal  Behavior is cooperative  Thought Content:  Thought content normal          Cognition and Memory: Cognition normal        Aby Blanc PA-C

## 2022-11-21 NOTE — ASSESSMENT & PLAN NOTE
Controlled off of medications  Limit sodium and salt intake  Avoid alcohol and caffeine  Recheck 3 months

## 2023-03-28 ENCOUNTER — HOSPITAL ENCOUNTER (OUTPATIENT)
Facility: HOSPITAL | Age: 68
Setting detail: OBSERVATION
Discharge: HOME | End: 2023-03-29
Attending: EMERGENCY MEDICINE | Admitting: HOSPITALIST

## 2023-03-28 ENCOUNTER — APPOINTMENT (EMERGENCY)
Dept: RADIOLOGY | Facility: HOSPITAL | Age: 68
End: 2023-03-28

## 2023-03-28 DIAGNOSIS — J45.901 MODERATE ACUTE EXACERBATION OF ASTHMA: ICD-10-CM

## 2023-03-28 DIAGNOSIS — J45.21 MILD INTERMITTENT EXTRINSIC ASTHMA WITH ACUTE EXACERBATION: ICD-10-CM

## 2023-03-28 DIAGNOSIS — N17.9 AKI (ACUTE KIDNEY INJURY) (HCC): ICD-10-CM

## 2023-03-28 DIAGNOSIS — I10 PRIMARY HYPERTENSION: ICD-10-CM

## 2023-03-28 DIAGNOSIS — J45.901 ASTHMA EXACERBATION: Primary | ICD-10-CM

## 2023-03-28 LAB
ANION GAP SERPL CALCULATED.3IONS-SCNC: 1 MMOL/L (ref 4–13)
ATRIAL RATE: 92 BPM
BASOPHILS # BLD AUTO: 0.07 THOUSANDS/ÂΜL (ref 0–0.1)
BASOPHILS NFR BLD AUTO: 1 % (ref 0–1)
BUN SERPL-MCNC: 25 MG/DL (ref 5–25)
CALCIUM SERPL-MCNC: 9 MG/DL (ref 8.3–10.1)
CARDIAC TROPONIN I PNL SERPL HS: 4 NG/L
CHLORIDE SERPL-SCNC: 111 MMOL/L (ref 96–108)
CO2 SERPL-SCNC: 29 MMOL/L (ref 21–32)
CREAT SERPL-MCNC: 1.51 MG/DL (ref 0.6–1.3)
EOSINOPHIL # BLD AUTO: 0.31 THOUSAND/ÂΜL (ref 0–0.61)
EOSINOPHIL NFR BLD AUTO: 2 % (ref 0–6)
ERYTHROCYTE [DISTWIDTH] IN BLOOD BY AUTOMATED COUNT: 14.4 % (ref 11.6–15.1)
GFR SERPL CREATININE-BSD FRML MDRD: 46 ML/MIN/1.73SQ M
GLUCOSE SERPL-MCNC: 93 MG/DL (ref 65–140)
HCT VFR BLD AUTO: 45.5 % (ref 36.5–49.3)
HGB BLD-MCNC: 15.1 G/DL (ref 12–17)
IMM GRANULOCYTES # BLD AUTO: 0.07 THOUSAND/UL (ref 0–0.2)
IMM GRANULOCYTES NFR BLD AUTO: 1 % (ref 0–2)
LYMPHOCYTES # BLD AUTO: 1.79 THOUSANDS/ÂΜL (ref 0.6–4.47)
LYMPHOCYTES NFR BLD AUTO: 12 % (ref 14–44)
MCH RBC QN AUTO: 30.8 PG (ref 26.8–34.3)
MCHC RBC AUTO-ENTMCNC: 33.2 G/DL (ref 31.4–37.4)
MCV RBC AUTO: 93 FL (ref 82–98)
MONOCYTES # BLD AUTO: 1.04 THOUSAND/ÂΜL (ref 0.17–1.22)
MONOCYTES NFR BLD AUTO: 7 % (ref 4–12)
NEUTROPHILS # BLD AUTO: 11.65 THOUSANDS/ÂΜL (ref 1.85–7.62)
NEUTS SEG NFR BLD AUTO: 77 % (ref 43–75)
NRBC BLD AUTO-RTO: 0 /100 WBCS
P AXIS: 74 DEGREES
PLATELET # BLD AUTO: 345 THOUSANDS/UL (ref 149–390)
PMV BLD AUTO: 9.6 FL (ref 8.9–12.7)
POTASSIUM SERPL-SCNC: 3.6 MMOL/L (ref 3.5–5.3)
PR INTERVAL: 144 MS
QRS AXIS: -48 DEGREES
QRSD INTERVAL: 88 MS
QT INTERVAL: 342 MS
QTC INTERVAL: 422 MS
RBC # BLD AUTO: 4.91 MILLION/UL (ref 3.88–5.62)
SODIUM SERPL-SCNC: 141 MMOL/L (ref 135–147)
T WAVE AXIS: 71 DEGREES
VENTRICULAR RATE: 92 BPM
WBC # BLD AUTO: 14.93 THOUSAND/UL (ref 4.31–10.16)

## 2023-03-28 RX ORDER — SODIUM CHLORIDE FOR INHALATION 0.9 %
3 VIAL, NEBULIZER (ML) INHALATION ONCE
Status: COMPLETED | OUTPATIENT
Start: 2023-03-28 | End: 2023-03-28

## 2023-03-28 RX ORDER — MAGNESIUM SULFATE HEPTAHYDRATE 40 MG/ML
2 INJECTION, SOLUTION INTRAVENOUS ONCE
Status: COMPLETED | OUTPATIENT
Start: 2023-03-28 | End: 2023-03-28

## 2023-03-28 RX ADMIN — MAGNESIUM SULFATE HEPTAHYDRATE 2 G: 40 INJECTION, SOLUTION INTRAVENOUS at 20:02

## 2023-03-28 RX ADMIN — ALBUTEROL SULFATE 10 MG: 2.5 SOLUTION RESPIRATORY (INHALATION) at 20:02

## 2023-03-28 RX ADMIN — ISODIUM CHLORIDE 3 ML: 0.03 SOLUTION RESPIRATORY (INHALATION) at 20:02

## 2023-03-28 RX ADMIN — ALBUTEROL SULFATE 10 MG: 2.5 SOLUTION RESPIRATORY (INHALATION) at 22:20

## 2023-03-28 RX ADMIN — PREDNISONE 50 MG: 20 TABLET ORAL at 19:59

## 2023-03-28 RX ADMIN — ISODIUM CHLORIDE 3 ML: 0.03 SOLUTION RESPIRATORY (INHALATION) at 22:20

## 2023-03-28 RX ADMIN — IPRATROPIUM BROMIDE 1 MG: 0.5 SOLUTION RESPIRATORY (INHALATION) at 20:02

## 2023-03-28 RX ADMIN — SODIUM CHLORIDE 1000 ML: 0.9 INJECTION, SOLUTION INTRAVENOUS at 21:18

## 2023-03-28 RX ADMIN — IPRATROPIUM BROMIDE 1 MG: 0.5 SOLUTION RESPIRATORY (INHALATION) at 22:20

## 2023-03-28 NOTE — ED ATTENDING ATTESTATION
3/28/2023  I, Lavera Prader, DO, saw and evaluated the patient  I have discussed the patient with the resident/non-physician practitioner and agree with the resident's/non-physician practitioner's findings, Plan of Care, and MDM as documented in the resident's/non-physician practitioner's note, except where noted  All available labs and Radiology studies were reviewed  I was present for key portions of any procedure(s) performed by the resident/non-physician practitioner and I was immediately available to provide assistance  At this point I agree with the current assessment done in the Emergency Department  I have conducted an independent evaluation of this patient a history and physical is as follows:    Chief Complaint   Patient presents with   • Shortness of Breath     Pt developed SOB two days ago along with chest pain  Pt has hx of asthma & has tried nebulizer tx w/o relief  Pt says his mother mixed cleaning chemicals together which made it worse  Pt also c/o HA & being diaphoretic        68-year-old male with a history of asthma  Worsening over the past few days  Using albuterol treatments at home  Feels like he is getting a headache from his shortness of breath  Has a dry cough  No fevers no chills  Reviewed last office visit on November 21, 2022  Hypertension, asthma  On arrival the patient is tachypneic and wheezing inspiratory and expiratory with prolonged expiratory phase  Respiratory distress  Not hypoxic    Hour-long heart neb of albuterol and ipratropium started    Impression: Acute respiratory distress secondary to asthma which could be in the setting of viral syndrome  Differential includes pneumonia, doubt CHF, doubt PE     Continue oxygen continue albuterol I will check labs chest x-ray      Interpreted the chest x-ray independently, there are no acute findings  Discussed with patient at the bedside risk of decompensation if discharged, meets criteria for admission to the hospital given that he is still tachypneic and has respiratory distress  Independently reviewed labs and no acute abnormality that would require intervention    ED Course     Patient somewhat improved but still requiring support on oxygen and albuterol    Critical Care Time  CriticalCare Time    Date/Time: 4/1/2023 2:17 PM  Performed by: Kim Cobian DO  Authorized by: Kim Cobian DO     Critical care provider statement:     Critical care time (minutes):  40    Critical care time was exclusive of:  Separately billable procedures and treating other patients and teaching time    Critical care was necessary to treat or prevent imminent or life-threatening deterioration of the following conditions:  Respiratory failure    Critical care was time spent personally by me on the following activities:  Blood draw for specimens, obtaining history from patient or surrogate, re-evaluation of patient's condition, review of old charts, evaluation of patient's response to treatment, examination of patient, ordering and review of laboratory studies, ordering and performing treatments and interventions, development of treatment plan with patient or surrogate and ordering and review of radiographic studies

## 2023-03-29 VITALS
DIASTOLIC BLOOD PRESSURE: 98 MMHG | HEIGHT: 68 IN | SYSTOLIC BLOOD PRESSURE: 169 MMHG | RESPIRATION RATE: 23 BRPM | OXYGEN SATURATION: 95 % | TEMPERATURE: 97.1 F | HEART RATE: 94 BPM | WEIGHT: 240 LBS | BODY MASS INDEX: 36.37 KG/M2

## 2023-03-29 PROBLEM — D72.829 LEUKOCYTOSIS: Status: ACTIVE | Noted: 2023-03-29

## 2023-03-29 PROBLEM — J45.21 MILD INTERMITTENT ASTHMA WITH ACUTE EXACERBATION: Status: ACTIVE | Noted: 2023-03-29

## 2023-03-29 PROBLEM — R79.89 ELEVATED SERUM CREATININE: Status: ACTIVE | Noted: 2023-03-29

## 2023-03-29 PROBLEM — J45.901 MODERATE ACUTE EXACERBATION OF ASTHMA: Status: ACTIVE | Noted: 2023-03-29

## 2023-03-29 PROBLEM — N17.9 AKI (ACUTE KIDNEY INJURY) (HCC): Status: ACTIVE | Noted: 2023-03-29

## 2023-03-29 LAB
ANION GAP SERPL CALCULATED.3IONS-SCNC: 7 MMOL/L (ref 4–13)
BUN SERPL-MCNC: 22 MG/DL (ref 5–25)
CALCIUM SERPL-MCNC: 8.6 MG/DL (ref 8.3–10.1)
CHLORIDE SERPL-SCNC: 110 MMOL/L (ref 96–108)
CO2 SERPL-SCNC: 21 MMOL/L (ref 21–32)
CREAT SERPL-MCNC: 1.14 MG/DL (ref 0.6–1.3)
ERYTHROCYTE [DISTWIDTH] IN BLOOD BY AUTOMATED COUNT: 14.7 % (ref 11.6–15.1)
FLUAV RNA RESP QL NAA+PROBE: NEGATIVE
FLUBV RNA RESP QL NAA+PROBE: NEGATIVE
GFR SERPL CREATININE-BSD FRML MDRD: 65 ML/MIN/1.73SQ M
GLUCOSE P FAST SERPL-MCNC: 200 MG/DL (ref 65–99)
GLUCOSE SERPL-MCNC: 200 MG/DL (ref 65–140)
HCT VFR BLD AUTO: 42.2 % (ref 36.5–49.3)
HGB BLD-MCNC: 14 G/DL (ref 12–17)
MCH RBC QN AUTO: 31.3 PG (ref 26.8–34.3)
MCHC RBC AUTO-ENTMCNC: 33.2 G/DL (ref 31.4–37.4)
MCV RBC AUTO: 94 FL (ref 82–98)
PLATELET # BLD AUTO: 322 THOUSANDS/UL (ref 149–390)
PMV BLD AUTO: 10.2 FL (ref 8.9–12.7)
POTASSIUM SERPL-SCNC: 4.2 MMOL/L (ref 3.5–5.3)
RBC # BLD AUTO: 4.48 MILLION/UL (ref 3.88–5.62)
RSV RNA RESP QL NAA+PROBE: NEGATIVE
SARS-COV-2 RNA RESP QL NAA+PROBE: NEGATIVE
SODIUM SERPL-SCNC: 138 MMOL/L (ref 135–147)
WBC # BLD AUTO: 18.12 THOUSAND/UL (ref 4.31–10.16)

## 2023-03-29 RX ORDER — IPRATROPIUM BROMIDE AND ALBUTEROL SULFATE 2.5; .5 MG/3ML; MG/3ML
3 SOLUTION RESPIRATORY (INHALATION)
Status: DISCONTINUED | OUTPATIENT
Start: 2023-03-29 | End: 2023-03-29

## 2023-03-29 RX ORDER — LEVALBUTEROL 1.25 MG/.5ML
1.25 SOLUTION, CONCENTRATE RESPIRATORY (INHALATION)
Status: DISCONTINUED | OUTPATIENT
Start: 2023-03-29 | End: 2023-03-29 | Stop reason: HOSPADM

## 2023-03-29 RX ORDER — HYDRALAZINE HYDROCHLORIDE 20 MG/ML
10 INJECTION INTRAMUSCULAR; INTRAVENOUS EVERY 6 HOURS PRN
Status: DISCONTINUED | OUTPATIENT
Start: 2023-03-29 | End: 2023-03-29 | Stop reason: HOSPADM

## 2023-03-29 RX ORDER — ENOXAPARIN SODIUM 100 MG/ML
40 INJECTION SUBCUTANEOUS DAILY
Status: DISCONTINUED | OUTPATIENT
Start: 2023-03-29 | End: 2023-03-29 | Stop reason: HOSPADM

## 2023-03-29 RX ORDER — ALBUTEROL SULFATE 90 UG/1
2 AEROSOL, METERED RESPIRATORY (INHALATION) EVERY 4 HOURS PRN
Qty: 8 G | Refills: 3 | Status: SHIPPED | OUTPATIENT
Start: 2023-03-29

## 2023-03-29 RX ORDER — METHYLPREDNISOLONE SODIUM SUCCINATE 40 MG/ML
40 INJECTION, POWDER, LYOPHILIZED, FOR SOLUTION INTRAMUSCULAR; INTRAVENOUS DAILY
Status: DISCONTINUED | OUTPATIENT
Start: 2023-03-29 | End: 2023-03-29

## 2023-03-29 RX ORDER — SODIUM CHLORIDE 9 MG/ML
100 INJECTION, SOLUTION INTRAVENOUS CONTINUOUS
Status: DISCONTINUED | OUTPATIENT
Start: 2023-03-29 | End: 2023-03-29

## 2023-03-29 RX ORDER — ALBUTEROL SULFATE 2.5 MG/3ML
2.5 SOLUTION RESPIRATORY (INHALATION) EVERY 4 HOURS PRN
Status: DISCONTINUED | OUTPATIENT
Start: 2023-03-29 | End: 2023-03-29 | Stop reason: HOSPADM

## 2023-03-29 RX ORDER — AMLODIPINE BESYLATE 5 MG/1
5 TABLET ORAL DAILY
Status: DISCONTINUED | OUTPATIENT
Start: 2023-03-29 | End: 2023-03-29 | Stop reason: HOSPADM

## 2023-03-29 RX ORDER — SODIUM CHLORIDE, SODIUM GLUCONATE, SODIUM ACETATE, POTASSIUM CHLORIDE, MAGNESIUM CHLORIDE, SODIUM PHOSPHATE, DIBASIC, AND POTASSIUM PHOSPHATE .53; .5; .37; .037; .03; .012; .00082 G/100ML; G/100ML; G/100ML; G/100ML; G/100ML; G/100ML; G/100ML
125 INJECTION, SOLUTION INTRAVENOUS CONTINUOUS
Status: DISCONTINUED | OUTPATIENT
Start: 2023-03-29 | End: 2023-03-29

## 2023-03-29 RX ORDER — MONTELUKAST SODIUM 10 MG/1
10 TABLET ORAL
Qty: 90 TABLET | Refills: 2 | Status: SHIPPED | OUTPATIENT
Start: 2023-03-29

## 2023-03-29 RX ORDER — MELATONIN
1000 DAILY
Status: DISCONTINUED | OUTPATIENT
Start: 2023-03-29 | End: 2023-03-29 | Stop reason: HOSPADM

## 2023-03-29 RX ORDER — AMLODIPINE BESYLATE 5 MG/1
5 TABLET ORAL DAILY
Qty: 90 TABLET | Refills: 0 | Status: SHIPPED | OUTPATIENT
Start: 2023-03-30

## 2023-03-29 RX ORDER — PREDNISONE 10 MG/1
TABLET ORAL
Qty: 20 TABLET | Refills: 0 | Status: SHIPPED | OUTPATIENT
Start: 2023-03-30 | End: 2023-04-07

## 2023-03-29 RX ORDER — ACETAMINOPHEN 325 MG/1
650 TABLET ORAL ONCE
Status: COMPLETED | OUTPATIENT
Start: 2023-03-29 | End: 2023-03-29

## 2023-03-29 RX ADMIN — IPRATROPIUM BROMIDE AND ALBUTEROL SULFATE 3 ML: 2.5; .5 SOLUTION RESPIRATORY (INHALATION) at 01:49

## 2023-03-29 RX ADMIN — LEVALBUTEROL 1.25 MG: 1.25 SOLUTION, CONCENTRATE RESPIRATORY (INHALATION) at 07:57

## 2023-03-29 RX ADMIN — ACETAMINOPHEN 650 MG: 325 TABLET ORAL at 02:27

## 2023-03-29 RX ADMIN — ENOXAPARIN SODIUM 40 MG: 40 INJECTION SUBCUTANEOUS at 08:24

## 2023-03-29 RX ADMIN — SODIUM CHLORIDE 100 ML/HR: 0.9 INJECTION, SOLUTION INTRAVENOUS at 00:38

## 2023-03-29 RX ADMIN — IPRATROPIUM BROMIDE 0.5 MG: 0.5 SOLUTION RESPIRATORY (INHALATION) at 07:57

## 2023-03-29 RX ADMIN — PREDNISONE 50 MG: 10 TABLET ORAL at 08:25

## 2023-03-29 RX ADMIN — SODIUM CHLORIDE, SODIUM GLUCONATE, SODIUM ACETATE, POTASSIUM CHLORIDE, MAGNESIUM CHLORIDE, SODIUM PHOSPHATE, DIBASIC, AND POTASSIUM PHOSPHATE 125 ML/HR: .53; .5; .37; .037; .03; .012; .00082 INJECTION, SOLUTION INTRAVENOUS at 02:27

## 2023-03-29 RX ADMIN — AMLODIPINE BESYLATE 5 MG: 5 TABLET ORAL at 09:26

## 2023-03-29 RX ADMIN — CHOLECALCIFEROL TAB 25 MCG (1000 UNIT) 1000 UNITS: 25 TAB at 08:02

## 2023-03-29 NOTE — ASSESSMENT & PLAN NOTE
"· WBC elevated 62 64 with neutrophilic dominance and tachypnea on admission  Eosinophils wnl  No other systemic s/s of infection; remains afebrile, CXR without evidence of PNA  COVID-19 negative on admission    · Likely reactive in setting of asthma exacerbation, increased airway bacterial burden; will likely remain elevated given patient receiving tx with steroids     Plan:  · See A&P under \"moderate acute asthma exacerbation\"  "

## 2023-03-29 NOTE — SEPSIS NOTE
Sepsis Note   Schuyler Cortez  75 y o  male MRN: [id number]  Unit/Bed#: -01 Encounter: [id number]        Patient was tachycardic, tachypneic, leukocytosis secondary to acute asthma exacerbation  Meets SIRS criteria  No fevers, leukocytosis secondary to steroids  No clinical signs of infection or URI  Body mass index is 36 49 kg/m²    Wt Readings from Last 1 Encounters:   03/28/23 109 kg (240 lb)     IBW (Ideal Body Weight): 68 4 kg    Ideal body weight: 68 4 kg (150 lb 12 7 oz)  Adjusted ideal body weight: 84 6 kg (186 lb 7 6 oz)     Sonia Woodard MD  Internal Medicine Residency, PGY-1  [address] [place] [address]

## 2023-03-29 NOTE — DISCHARGE SUMMARY
INTERNAL MEDICINE RESIDENCY DISCHARGE SUMMARY     Alejo Fink    75 y o  male  MRN: [id number]  Room/Bed: /-[address] [place] [address] 5   Encounter: [id number]    Principal Problem: Moderate acute exacerbation of asthma  Active Problems:    Hypertension    Vitamin D deficiency    Elevated serum creatinine    Leukocytosis      * Moderate acute exacerbation of asthma  Assessment & Plan  · Patient with hx of childhood asthma managed on rescue inhaler with prior admission for asthma exacerbation requiring intubation back in 2004 who presented with two days of dyspnea at rest and with exertion with wheezing in setting of seasonal changes and recent exposure to household cleaning chemical products; no ED evaluations for exacerbations within past year  Displays mild conversational dyspnea, able to speak in phrases with preference to sit up; tachypnea, tachycardic (but less than <120), audible expiratory wheezing without signs of increased WOB, satting 95% on RA  No s/s of imminent respiratory arrest  CXR negative for concomitant PNA or acute cardiopulmonary disease     · Clinical presentation consistent with moderate asthma exacerbation   · S/P tx with nebulized albuterol and Atrovent x2, IV mag 2g, and Prednisone 40mg x1 in the ED prior to admission with symptomatic improvement    CXR- hyperexpanded lungs  No  O2 requirement    Plan:  · Scheduled nebulized bronchodilators- Xopenex and Atrovent TID to relieve airway obstruction caused by bronchospasm  · Will start systemic corticosteroid - prednisone 50 mg oral  · Montelukast for 4-6 weeks to ameliorate the seasonal exacerbation  · Will send him home on an eight day prednisone taper starting at 40 mg  · Supplemental O2 as needed to maintain O2 sat 93-95%                Hypertension  Assessment & Plan  · Known history; previously managed on Lisinopril 10mg, however being monitored off antihypertensives by "primary PTA  · BP on presentation 233/116 in setting of asthma exacerbation and elevated serum creatinine 1 51 from baseline 1-1 21 (most recent PTA serum Cr 1 21 in 8/2021); no HA, vision changes, dizziness  · BP improved to 150/96 on admisison without use antihypertensives; BP improvement likely due to symptomatic relief from nebs prior to admission   · May also have component of hypertensive heart disease given left anterior fasicular block on initial ECG, consistent with prior ECG readings; no LVH    Plan:  · PRN IV hydralazine 10mg if SBP >180  · Start amlodipine 5 mg daily    Leukocytosis  Assessment & Plan  · WBC elevated 73 18 with neutrophilic dominance and tachypnea on admission  Eosinophils wnl  No other systemic s/s of infection; remains afebrile, CXR without evidence of PNA  COVID-19 negative on admission  · Likely reactive in setting of asthma exacerbation, increased airway bacterial burden; will likely remain elevated given patient receiving tx with steroids     Plan:  · See A&P under \"moderate acute asthma exacerbation\"    Elevated serum creatinine  Assessment & Plan  · Serum creatinine on presentation 1 51, compared to baseline 1 0-1 21 (most recent value 1 21 noted in 8/2021)  · In setting of elevated /116 on presentation, improved to 150/96 on admission without use of antihypertensives  · No evidence of increase in in Cr by  3mg within past 48h or decreased UO; does not meet other KDIGO criteria for MORENITA at this time       Plan:  · IVF with isolyte at 125cc/hr for maintenance hydration  · Optimize BP control as discussed below  · Can consider UA to assess for proteinuria in setting of hypertensive crisis on presentation, however BP transiently elevated and improved without further intevention  · Repeat AM BMP, monitor serum Cr  · Avoid use of nephrotoxic agents     Vitamin D deficiency  Assessment & Plan  · Known hx  · Nost recent Vitamin D level- 20 5 (10/2021)  · Managed on VitD3 " supplement at home    Plan:  · Continue home dose VitD3 1000U qd      [address] Neva Lentz is a 74 yo M with a past medical history of hypertension not currently on medication, previously on lisinopril, vitamin D deficiency, and mild persistent asthma diagnosed in childhood without documented PFT's presented with 2 days of shortness of breath and intermittent chest tightness in the setting of moderate asthma exacerbation  This is a very similar episode to his previous asthma exacerbations  He attributes his symptoms to seasonal changes and exposure to high amounts of cleaning products  States he goes weeks to months without using a rescue inhaler  Does endorse history of previous admission requiring intubation in 2004, none since then  No ED visits for asthma exacerbations within past year  On ED arrival patient was afebrile, tachycardic, tachypneic saturating on 95% on RA and hypertensive to 233/116  On exam he had bilateral diffuse wheezing without accessory muscle use  CBC showed leukocytosis 14 93, BMP notable for elevated serum Cr 1 51, compared to baseline 1 0-1 2  Initial Trops negative  ECG with NSR, rate 92, LAFB  CXR demonstrated no acute cardiopulmonary findings  Patient subsequently treated for presumed asthma exacerbation with prednisone, mag 2g, and nebulized bronchodilators including albuterol and Atrovent minimal symptomatic improvement, requiring additional round of nebulized tx with albuterol and Atrovent  He met SIRS criteria on admission so he also received 1L bolus of NS  BP on repeat checks 176/91 without use of medical intervention  Patient was subsequently admitted to [facility] for further observation for acute asthma exacerbation  His hospital course was uncomplicated  He received scheduled nebulizer of ipratropium 0 5 mg, levalbuterol 1 25 mg, and prednisone 50 mg   He clinically improved as evidenced by his resolution of wheezing on exam and his shortness of breath  We will send him home on a 8 day prednisone taper starting at 40 mg and montelukast to take for 4-6 weeks before seasonal changes to avoid exacerbation trigger  In the future, he will take montelukast 2 weeks prior to seasonal changes for 4-6 weeks  We also started him on amlodipine 5 mg given his history of hypertension and evidence of hypertensive heart disease  He was deemed clinically stable for discharge on 03/29/23  Patient seen and examined on day of discharge  Patient reports feeling much improved from yesterday  Denies any shortness of breath while ambulating or at rest  Denies any cough with productive sputum, fever/chills, or chest pain  DISCHARGE INFORMATION     PCP at Discharge: Fidencio Lam MD    Admitting Provider: Hazel Stephenson MD  Admission Date: 3/28/2023    Discharge Provider: Hazel Stephenson MD  Discharge Date: 03/29/23    Discharge Disposition: Home/Self Care  Discharge Condition: stable  Discharge with Lines: no    Discharge Diet: regular diet  Activity Restrictions: none  Test Results Pending at Discharge: none  Discharge Diagnoses:  Principal Problem: Moderate acute exacerbation of asthma  Active Problems:    Hypertension    Vitamin D deficiency    Elevated serum creatinine    Leukocytosis  Resolved Problems:    * No resolved hospital problems  *      Consulting Providers:    N/A  Diagnostic & Therapeutic Procedures Performed:  No results found      Code Status: Level 1 - Full Code  Advance Directive & Living Will: <no information>  Power of :    POLST:      Medications:  Current Discharge Medication List        Current Discharge Medication List        Current Discharge Medication List      CONTINUE these medications which have NOT CHANGED    Details   albuterol (PROVENTIL HFA,VENTOLIN HFA) 90 mcg/act inhaler Inhale 2 puffs every 4 (four) hours as needed for wheezing  Qty: 1 Inhaler, Refills: 0      Androderm 4 MG/24HR PT24 APPLY 1 PATCH EVERY DAY  Qty: 30 patch, "Refills: 2    Comments: This request is for a new prescription for a controlled substance as required by Federal/State law  DX Code Needed    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      cholecalciferol (VITAMIN D3) 1,000 units tablet Take 1 tablet (1,000 Units total) by mouth daily  Qty: 90 tablet, Refills: 0    Associated Diagnoses: Vitamin D deficiency             Allergies:  No Known Allergies    FOLLOW-UP     PCP Outpatient Follow-up:  yes  please follow up with your primary care physician in 1-3 weeks  Consulting Providers Follow-up:  none required     Active Issues Requiring Follow-up:   none    Discharge Statement:   I spent 45 minutes minutes discharging the patient  This time was spent on the day of discharge  I had direct contact with the patient on the day of discharge  Additional documentation is required if more than 30 minutes were spent on discharge  Portions of the record may have been created with voice recognition software  Occasional wrong word or \"sound a like\" substitutions may have occurred due to the inherent limitations of voice recognition software    Read the chart carefully and recognize, using context, where substitutions have occurred     ==  Terrence Ricks MD  [address] [facility]  Internal Medicine Resident PGY-1      "

## 2023-03-29 NOTE — ASSESSMENT & PLAN NOTE
· Patient with hx of childhood asthma managed on rescue inhaler with prior admission for asthma exacerbation requiring intubation back in 2004 who presented with two days of dyspnea at rest and with exertion with wheezing in setting of seasonal changes and recent exposure to household cleaning chemical products; no ED evaluations for exacerbations within past year  Displays mild conversational dyspnea, able to speak in phrases with preference to sit up; tachypnea, tachycardic (but less than <120), audible expiratory wheezing without signs of increased WOB, satting 95% on RA  No s/s of imminent respiratory arrest  CXR negative for concomitant PNA or acute cardiopulmonary disease     · Clinical presentation consistent with moderate asthma exacerbation   · S/P tx with nebulized albuterol and Atrovent x2, IV mag 2g, and Prednisone 40mg x1 in the ED prior to admission with symptomatic improvement    CXR- hyperexpanded lungs  No  O2 requirement    Plan:  · Scheduled nebulized bronchodilators- Xopenex and Atrovent TID to relieve airway obstruction caused by bronchospasm  · Will start systemic corticosteroid - prednisone 50 mg oral  · Montelukast for 4-6 weeks to ameliorate the seasonal exacerbation  · Will send him home on an eight day prednisone taper starting at 40 mg  · Supplemental O2 as needed to maintain O2 sat 93-95%

## 2023-03-29 NOTE — NURSING NOTE
All discharge paperwork, medications and follow-up appointments reviewed with patient  Patient verbalized understanding

## 2023-03-29 NOTE — ASSESSMENT & PLAN NOTE
· Serum creatinine on presentation 1 51, compared to baseline 1 0-1 21 (most recent value 1 21 noted in 8/2021)  · In setting of elevated /116 on presentation, improved to 150/96 on admission without use of antihypertensives  · No evidence of increase in in Cr by  3mg within past 48h or decreased UO; does not meet other KDIGO criteria for MORENITA at this time       Plan:  · IVF with isolyte at 125cc/hr for maintenance hydration  · Optimize BP control as discussed below  · Can consider UA to assess for proteinuria in setting of hypertensive crisis on presentation, however BP transiently elevated and improved without further intevention  · Repeat AM BMP, monitor serum Cr  · Avoid use of nephrotoxic agents

## 2023-03-29 NOTE — ASSESSMENT & PLAN NOTE
· Known hx  · Nost recent Vitamin D level- 20 5 (10/2021)  · Managed on VitD3 supplement at home    Plan:  · Continue home dose VitD3 1000U qd

## 2023-03-29 NOTE — UTILIZATION REVIEW
Initial Clinical Review    Admission: Date/Time/Statement:   Admission Orders (From admission, onward)     Ordered        03/28/23 1452  Place in Observation  Once                      Orders Placed This Encounter   Procedures   • Place in Observation     Standing Status:   Standing     Number of Occurrences:   1     Order Specific Question:   Level of Care     Answer:   Med Surg [16]     ED Arrival Information     Expected   -    Arrival   3/28/2023 19:35    Acuity   Emergent            Means of arrival   Walk-In    Escorted by   [address] [place] [address]    Admission type   Emergency            Arrival complaint   Shortness of breath           Chief Complaint   Patient presents with   • Shortness of Breath     Pt developed SOB two days ago along with chest pain  Pt has hx of asthma & has tried nebulizer tx w/o relief  Pt says his mother mixed cleaning chemicals together which made it worse  Pt also c/o HA & being diaphoretic        Initial Presentation: 75 y o  male , presented to the ED @ [facility] from home via walk in  Admitted as Observation due to Moderate Acute Exacerbation of Asthma  Past medical history of obesity class 2 (BMI 36 49), primary HTN without use of antihypertensive medications and mild persistent asthma diagnosed in childhood without documented PFT on file  on albuterol rescue inhaler  Date:  03/28/2023    Presented to ED on 3/27 with shortness of breath that began two days ago with intermittent chest tightness  Attributes his symptoms to seasonal changes however also mentions that his symptoms were exacerbated after he was exposed to mixing of household cleaning chemicals at his home 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, who he takes care of  Typically has exacerbations at this time of the year treated with rescue inhaler  States goes weeks to months without using rescue inhaler  Does endorse history of previous admission requiring intubation in 2004, none since then   No ED visits for asthma exacerbations within past year  Scheduled nebulized bronchodilators- Xopenex and Atrovent TID to relieve airway obstruction caused by bronchospasm  Systemic corticosteroid with IV methylprednisolone 40mg qd, transition to PO prednisone at time of discharge  Supplemental O2 as needed to maintain O2 sat 93-95%  Currently on room air  Day 2: 2023  Scheduled nebulized bronchodilators- Xopenex and Atrovent TID  PO prednisone  Montelukast   DC on Pred taper  ED Triage Vitals [23 194]   Temperature Pulse Respirations Blood Pressure SpO2   99 5 °F (37 5 °C) 102 (!) 23 (!) 233/116 95 %      Temp Source Heart Rate Source Patient Position - Orthostatic VS BP Location FiO2 (%)   Oral Monitor Sitting Left arm --      Pain Score       4          Wt Readings from Last 1 Encounters:   23 109 kg (240 lb)     Additional Vital Signs:   Date/Time Temp Pulse Resp BP MAP (mmHg) SpO2 O2 Device Patient Position - Orthostatic VS   23 08:22:32 97 1 °F (36 2 °C) Abnormal  94 23 Abnormal  169/98 122 95 % -- Lying   23 0757 -- -- -- -- -- 92 % None (Room air) --   23 07:29:49 -- 88 17 164/99 121 96 % -- Lying   23 02:30:37 -- 99 -- 149/95 113 94 % -- --   23 0021 -- -- -- -- -- -- None (Room air) --   23 00:07:41 -- 93 -- 150/93 112 95 % -- --   23 -- 99 22 176/91 Abnormal  124 96 % None (Room air) Lying   23 -- -- -- -- -- 97 % -- --   23 -- -- -- -- -- 98 % -- --     2023 @ 1952  EC, NSR, Left anterior fascicular block  Pertinent Labs/Diagnostic Test Results:   XR chest 2 views   ED Interpretation by Bessy Wise DO ([id number])   No consolidation, no ptx           Results from last 7 days   Lab Units 23  0040   SARS-COV-2  Negative     Results from last 7 days   Lab Units 23  0527 23   WBC Thousand/uL 18 12* 14 93*   HEMOGLOBIN g/dL 14 0 15 1   HEMATOCRIT % 42 2 45 5   PLATELETS Thousands/uL [address] Thousands/µL  --  11 65*     Results from last 7 days   Lab Units 03/29/23 0527 03/28/23 2000   SODIUM mmol/L 138 141   POTASSIUM mmol/L 4 2 3 6   CHLORIDE mmol/L 110* 111*   CO2 mmol/L 21 29   ANION GAP mmol/L 7 1*   BUN mg/dL 22 25   CREATININE mg/dL 1 14 1 51*   EGFR ml/min/1 73sq m 65 46   CALCIUM mg/dL 8 6 9 0     Results from last 7 days   Lab Units 03/29/23 0527 03/28/23 2000   GLUCOSE RANDOM mg/dL 200* 93     Results from last 7 days   Lab Units 03/28/23 2000   HS TNI 0HR ng/L 4     Results from last 7 days   Lab Units 03/29/23  0040   INFLUENZA A PCR  Negative   INFLUENZA B PCR  Negative   RSV PCR  Negative     ED Treatment:   Medication Administration from 03/28/2023 1935 to 03/29/2023 0004       Date/Time Order Dose Route Action     03/28/2023 2002 EDT albuterol inhalation solution 10 mg 10 mg Nebulization Given     03/28/2023 2002 EDT ipratropium (ATROVENT) 0 02 % inhalation solution 1 mg 1 mg Nebulization Given     03/28/2023 2002 EDT sodium chloride 0 9 % inhalation solution 3 mL 3 mL Nebulization Given     03/28/2023 2002 EDT magnesium sulfate 2 g/50 mL IVPB (premix) 2 g 2 g Intravenous New Bag     03/28/2023 1959 EDT predniSONE tablet 50 mg 50 mg Oral Given     03/28/2023 2118 EDT sodium chloride 0 9 % bolus 1,000 mL 1,000 mL Intravenous New Bag     03/28/2023 2220 EDT albuterol inhalation solution 10 mg 10 mg Nebulization Given     03/28/2023 2220 EDT ipratropium (ATROVENT) 0 02 % inhalation solution 1 mg 1 mg Nebulization Given     03/28/2023 2220 EDT sodium chloride 0 9 % inhalation solution 3 mL 3 mL Nebulization Given        Past Medical History:   Diagnosis Date   • Asthma    • Need for prophylactic vaccination and inoculation against influenza    • Right inguinal hernia 9/19/2014     Present on Admission:  • Hypertension  • Moderate acute exacerbation of asthma  • Elevated serum creatinine  • Vitamin D deficiency      Admitting Diagnosis: SOB (shortness of breath) [R06 02]  MORENITA (acute kidney injury) ([facility] [address] ) [N17 9]  Asthma exacerbation [J45 901]  Age/Sex: 75 y o  male  Admission Orders:  Regular diet  Up & OOB as tolerated  IS  I&O  Tan SCDs    Scheduled Medications:  amLODIPine, 5 mg, Oral, Daily  cholecalciferol, 1,000 Units, Oral, Daily  enoxaparin, 40 mg, Subcutaneous, Daily  ipratropium, 0 5 mg, Nebulization, TID  levalbuterol, 1 25 mg, Nebulization, TID  predniSONE, 50 mg, Oral, Daily      Continuous IV Infusions:     PRN Meds:  albuterol, 2 5 mg, Nebulization, Q4H PRN  hydrALAZINE, 10 mg, Intravenous, Q6H PRN        IP CONSULT TO CASE MANAGEMENT    Network Utilization Review Department  ATTENTION: Please call with any questions or concerns to [phone] and carefully listen to the prompts so that you are directed to the right person  All voicemails are confidential   Tae Fulling all requests for admission clinical reviews, approved or denied determinations and any other requests to dedicated fax number below belonging to the campus where the patient is receiving treatment   List of dedicated fax numbers for the Facilities:  [address] DENIALS (Administrative/Medical Necessity) [phone]   [address] (Maternity/NICU/Pediatrics) [phone]   919 Jacqueline Anaya [phone]   [facility] Bobby [address] [phone]   [address] [place] [address] [facility] [phone]   [facility] Saturnino Arizmendi [facility] 134 [facility] [phone]

## 2023-03-29 NOTE — PLAN OF CARE
Problem: PAIN - ADULT  Goal: Verbalizes/displays adequate comfort level or baseline comfort level  Description: Interventions:  - Encourage patient to monitor pain and request assistance  - Assess pain using appropriate pain scale  - Administer analgesics based on type and severity of pain and evaluate response  - Implement non-pharmacological measures as appropriate and evaluate response  - Consider cultural and social influences on pain and pain management  - Notify physician/advanced practitioner if interventions unsuccessful or patient reports new pain  Outcome: Progressing     Problem: SAFETY ADULT  Goal: Maintain or return to baseline ADL function  Description: INTERVENTIONS:  -  Assess patient's ability to carry out ADLs; assess patient's baseline for ADL function and identify physical deficits which impact ability to perform ADLs (bathing, care of mouth/teeth, toileting, grooming, dressing, etc )  - Assess/evaluate cause of self-care deficits   - Assess range of motion  - Assess patient's mobility; develop plan if impaired  - Assess patient's need for assistive devices and provide as appropriate  - Encourage maximum independence but intervene and supervise when necessary  - Involve family in performance of ADLs  - Assess for home care needs following discharge   - Consider OT consult to assist with ADL evaluation and planning for discharge  - Provide patient education as appropriate  Outcome: Progressing     Problem: RESPIRATORY - ADULT  Goal: Achieves optimal ventilation and oxygenation  Description: INTERVENTIONS:  - Assess for changes in respiratory status  - Assess for changes in mentation and behavior  - Position to facilitate oxygenation and minimize respiratory effort  - Oxygen administered by appropriate delivery if ordered  - Initiate smoking cessation education as indicated  - Encourage broncho-pulmonary hygiene including cough, deep breathe, Incentive Spirometry  - Assess the need for suctioning and aspirate as needed  - Assess and instruct to report SOB or any respiratory difficulty  - Respiratory Therapy support as indicated  Outcome: Progressing

## 2023-03-29 NOTE — CASE MANAGEMENT
Case Management Assessment & Discharge Planning Note    Patient name Janene Lomeli  Location /-01 MRN [id number]  : 1955 Date 3/29/2023       Current Admission Date: 3/28/2023  Current Admission Diagnosis:Moderate acute exacerbation of asthma   Patient Active Problem List    Diagnosis Date Noted   • Moderate acute exacerbation of asthma 2023   • Elevated serum creatinine 2023   • Leukocytosis 2023   • BMI 35 0-35 9,adult 2022   • Vitamin D deficiency 2022   • Hypertension 2022   • Secondary male hypogonadism 2019   • Need for pneumococcal vaccination 2018   • Depression 2017   • Impaired fasting glucose 2014      LOS (days): 0  Geometric Mean LOS (GMLOS) (days):   Days to GMLOS:     OBJECTIVE:        Current admission status: Observation       Preferred Pharmacy:   [facility]/pharmacy [address], [address] [facility] [address]  [address] [facility]  Phone: [phone] Fax: [phone]    Primary Care Provider: Ana Rosa Ospina MD    Primary Insurance: [facility]  Secondary Insurance: [address] [place] [address] Court:  Oniel  Proxies    There are no active Health Care Proxies on file            Readmission Root Cause  30 Day Readmission: No    Patient Information  Admitted from[de-identified] Home  Mental Status: Alert  During Assessment patient was accompanied by: Not accompanied during assessment  Assessment information provided by[de-identified] Patient  Primary Caregiver: Self  Support Systems: [address] [facility] of Residence: [address] [place] [address] do you live in?: [place]  Type of Current Residence: 2 story home  Upon entering residence, is there a bedroom on the main floor (no further steps)?: No  A bedroom is located on the following floor levels of residence (select all that apply):: 2nd Floor  Upon entering residence, is there a bathroom on the main floor (no further steps)?: Yes  Number of steps to 2nd floor from main floor: One Flight  In the last 12 months, was there a time when you were not able to pay the mortgage or rent on time?: No  In the last 12 months, how many places have you lived?: 1  In the last 12 months, was there a time when you did not have a steady place to sleep or slept in a shelter (including now)?: No  Homeless/housing insecurity resource given?: No  Living Arrangements: Lives w/ Parent(s)    Activities of Daily Living Prior to Admission  Functional Status: Independent  Completes ADLs independently?: Yes  Ambulates independently?: Yes  Does patient use assisted devices?: No  Does patient currently own DME?: No  Does patient have a history of Outpatient Therapy (PT/OT)?: No  Does the patient have a history of Short-Term Rehab?: No  Does patient have a history of HHC?: No  Does patient currently have Alignment Acquisitions?: No    Patient Information Continued  Income Source: Pension/long-term  Does patient have prescription coverage?: Yes  Within the past 12 months, you worried that your food would run out before you got the money to buy more : Never true  Within the past 12 months, the food you bought just didn't last and you didn't have money to get more : Never true  Food insecurity resource given?: No  Does patient receive dialysis treatments?: No  Does patient have a history of substance abuse?: No  Does patient have a history of Mental Health Diagnosis?: No    Means of Transportation  Means of Transport to Appts[de-identified] Drives Self  In the past 12 months, has lack of transportation kept you from medical appointments or from getting medications?: No  In the past 12 months, has lack of transportation kept you from meetings, work, or from getting things needed for daily living?: No  Was application for public transport provided?: No    DISCHARGE DETAILS:    Discharge planning discussed with[de-identified] Patient  Freedom of Choice: No        Were Treatment Team discharge recommendations reviewed with patient/caregiver?: Yes  Did patient/caregiver verbalize understanding of patient care needs?: Yes  Were patient/caregiver advised of the risks associated with not following Treatment Team discharge recommendations?: Yes    Treatment Team Recommendation: Home  Discharge Destination Plan[de-identified] Home  Transport at Discharge : Self      Additional Comments: CM met with pt at bedside  Pt is ready for d/c at this time per provider  ZENAIDA spoke with pt at bedside who states that he has no current needs  Pt states that he drove himself to the hospital and will drive himself home  Pt states he is ready to go home when he gets his d/c papers

## 2023-03-29 NOTE — PROGRESS NOTES
INTERNAL MEDICINE RESIDENCY SENIOR ADMISSION NOTE     Name: Pepito Wolf  Age & Sex: 75 y o  male   MRN: [id number]  Unit/Bed#: -01   Encounter: [id number]  Primary Care Provider: Tika Mccabe MD    Admit to team: SOD Team A    Patient seen and examined  Reviewed H&P per Dr Chele Rahman   Agree with the assessment and plan with any exception/addition as noted below:    Mr Isabella Iqbal is a 44-year-old male with past medical history of asthma, primary hypertension controlled off of medications, impaired fasting glucose, vitamin D deficiency  Patient presented to [facility] emergency department on 03/28/2023 with complaint of shortness of breath  He stated that his shortness of breath started approximately 2 days prior with associated chest pains  Patient states he has history of asthma and has tried nebulizer treatments at home without relief in symptoms  He also mentioned that his mother has been mixing cleaning chemicals together which seems to have exacerbated his underlying asthma  When he presented he was also complaining of a headache and feeling sweaty  When he arrived, he was afebrile with temperature 99 5 °F, pulse 102, respiratory rate 23, and blood pressure elevated at 233/116  Patient's CBC showed a WBC count elevated at 14 93, and basic metabolic panel showed creatinine elevated to 1 51 from baseline of 1 0-1 2  High-sensitivity troponin was negative  EKG showed normal sinus rhythm with left anterior fascicular block  Chest x-ray performed in ED without focal consolidations, pending official read  Patient received treatment for asthma with nebulized albuterol and nebulized ipratropium along with nebulized sodium chloride with some improvement in symptoms  He received magnesium sulfate 2 g intravenously and prednisone 50 mg once  He also received a 1000 mL sodium chloride bolus    Patient was still with wheezing on examination and so he received an additional albuterol nebulizer, ipratropium nebulizer, and sodium chloride nebulizer  Patient's blood pressure improved to 176/91 after treatment and with improvement of symptoms  Patient was evaluated at bedside for admission  At time of encounter, patient much improved from presentation  Patient was seated upright and was able to talk in full sentences  Patient did have a loud, audible wheeze  BP improved to systolic 678 while in room with patient and without intervention  Patient did state that he has flares of his asthma when the weather changes  He states that many years ago in early 2000's he required hospitalization for asthma  He also states that many years ago he required intubation for asthma exacerbation  Denies any recent illnesses or sick contacts  Patient will be admitted to the [place] service under observation for acute asthma exacerbation  Principal Problem: Moderate acute exacerbation of asthma  Active Problems:    Vitamin D deficiency    Hypertension    Elevated serum creatinine    Leukocytosis    * Moderate acute exacerbation of asthma  Assessment & Plan  · Patient with hx of childhood asthma managed on rescue inhaler with prior admission requiring intubation back in 2004 who presented with two days of dyspnea at rest and with exertion with wheezing in setting of seasonal changes and recent exposure to household no ED evaluations for exacerbations  Displays mild conversational dyspnea, able to speak in phrases with preference to sit up; tachypnea, tachycardic (but less than <120), audible expiratory wheezing without signs of increased WOB, satting 95% on RA  No s/s of imminent respiratory arrest  CXR negative for concomitant PNA or acute cardiopulmonary disease     · Clinical presentation consistent with moderate asthma exacerbation   · S/P tx with nebulized albuterol and Atrovent x2, IV mag 2g, and Prednisone 40mg x1 in the ED prior to admission with symptomatic "improvement      Plan:  · Scheduled Xopenex and Atrovent TID to relieve airway obstruction caused by bronchospasm  · Systemic corticosteroid with IV methylprednisolone 40mg qd, transition to PO prednisone at time of discharge  · Can consider PEFR to further asses asthma severity, however not indicated given clinical presentation   · Supplemental O2 as needed to maintain O2 sat 93-95%                Leukocytosis  Assessment & Plan  · WBC elevated 44 90 with neutrophilic dominance and tachypnea on admission  Eosinophils wnl  No other systemic s/s of infection; remains afebrile, CXR without evidence of PNA  COVID-19 negative on admission  · Likely reactive in setting of asthma exacerbation, increased airway bacterial burden; will likely remain elevated given patient receiving tx with steroids     Plan:  · See A&P under \"moderate acute asthma exacerbation\"    Elevated serum creatinine  Assessment & Plan  · Serum creatinine on presentation 1 51, compared to baseline 1 0-1 21 (most recent value 1 21 noted in 8/2023)  · In setting of elevated /116 on presentation, improved to 150/96 on admission without use of antihypertensives  · No evidence of increase in in Cr by  3mg within past 48h or decreased UO; does not meet other KDIGO criteria for [place] at this time  Plan:  · IVF with isolyte at 125cc/hr for maintenance hydration  · Optimize BP control as discussed below  · Can consider UA to assess for proteinuria in setting of hypertensive crisis on presentation, however BP transiently elevated and improved without further intevention  · Repeat AM BMP, monitor serum Cr  · Avoid use of nephrotoxic agents     Hypertension  Assessment & Plan  · Known history; previously managed on Lisinopril 10mg, however being monitored off antihypertensives by primary PTA    · BP on presentation 233/116 in setting of asthma exacerbation and elevated serum creatinine 1 51 from baseline 1-1 21 (most recent PTA serum Cr 1 21 in 8/2021); no " HA, vision changes, dizziness  · BP improved to 150/96 on admisison without use antihypertensives; BP improvement likely due to symptomatic relief from nebs prior to admission   · May also have component of hypertensive heart disease given left anterior fasicular block on initial ECG, consistent with prior ECG readings; no LVH    Plan:  · PRN IV hydralazine 10mg if SBP >180  · Consider re-initiating prior home dose ACEi once serum Cr normalizes with above-noted management      Vitamin D deficiency  Assessment & Plan  · Known hx  · Nost recent Vitamin D level- 20 5 (10/2021)  · Managed on VitD3 supplement at home    Plan:  · Continue home dose VitD3 1000U qd      Code Status: Level 1 - Full Code  Admission Status: OBSERVATION  Disposition: Patient requires Med/Surg  Expected Length of Stay: <2 Midnights

## 2023-03-29 NOTE — H&P
INTERNAL MEDICINE RESIDENCY ADMISSION H&P     Name: Pepito Wolf  Age & Sex: 75 y o  male   MRN: [id number]  Unit/Bed#: -01   Encounter: [id number]  Primary Care Provider: Tika Mccabe MD    Code Status: Level 1 - Full Code  Admission Status: OBSERVATION  Disposition: Patient requires Med/Surg    Admit to team: SOD Team A    ASSESSMENT/PLAN     Principal Problem: Moderate acute exacerbation of asthma  Active Problems:    Elevated serum creatinine    Hypertension    Leukocytosis    Vitamin D deficiency    * Moderate acute exacerbation of asthma  Assessment & Plan  · Patient with hx of childhood asthma managed on rescue inhaler with prior admission for asthma exacerbation requiring intubation back in 2004 who presented with two days of dyspnea at rest and with exertion with wheezing in setting of seasonal changes and recent exposure to household cleaning chemical products; no ED evaluations for exacerbations within past year  Displays mild conversational dyspnea, able to speak in phrases with preference to sit up; tachypnea, tachycardic (but less than <120), audible expiratory wheezing without signs of increased WOB, satting 95% on RA  No s/s of imminent respiratory arrest  CXR negative for concomitant PNA or acute cardiopulmonary disease     · Clinical presentation consistent with moderate asthma exacerbation   · S/P tx with nebulized albuterol and Atrovent x2, IV mag 2g, and Prednisone 40mg x1 in the ED prior to admission with symptomatic improvement      Plan:  · Scheduled nebulized bronchodilators- Xopenex and Atrovent TID to relieve airway obstruction caused by bronchospasm  · Systemic corticosteroid with IV methylprednisolone 40mg qd, transition to PO prednisone at time of discharge  · Can consider PEFR to further asses asthma severity, however not indicated given clinical presentation   · Supplemental O2 as needed to maintain O2 sat 93-95%                Elevated serum creatinine  Assessment & Plan  · Serum creatinine on presentation 1 51, compared to baseline 1 0-1 21 (most recent value 1 21 noted in 8/2021)  · In setting of elevated /116 on presentation, improved to 150/96 on admission without use of antihypertensives  · No evidence of increase in in Cr by  3mg within past 48h or decreased UO; does not meet other KDIGO criteria for [place] at this time  Plan:  · IVF with isolyte at 125cc/hr for maintenance hydration  · Optimize BP control as discussed below  · Can consider UA to assess for proteinuria in setting of hypertensive crisis on presentation, however BP transiently elevated and improved without further intevention  · Repeat AM BMP, monitor serum Cr  · Avoid use of nephrotoxic agents     Hypertension  Assessment & Plan  · Known history; previously managed on Lisinopril 10mg, however being monitored off antihypertensives by primary PTA  · BP on presentation 233/116 in setting of asthma exacerbation and elevated serum creatinine 1 51 from baseline 1-1 21 (most recent PTA serum Cr 1 21 in 8/2021); no HA, vision changes, dizziness  · BP improved to 150/96 on admisison without use antihypertensives; BP improvement likely due to symptomatic relief from nebs prior to admission   · May also have component of hypertensive heart disease given left anterior fasicular block on initial ECG, consistent with prior ECG readings; no LVH    Plan:  · PRN IV hydralazine 10mg if SBP >180  · Consider re-initiating prior home dose ACEi once serum Cr normalizes with above-noted management      Leukocytosis  Assessment & Plan  · WBC elevated 38 73 with neutrophilic dominance and tachypnea on admission  Eosinophils wnl  No other systemic s/s of infection; remains afebrile, CXR without evidence of PNA  COVID-19 negative on admission    · Likely reactive in setting of asthma exacerbation, increased airway bacterial burden; will likely remain elevated given patient receiving tx with steroids "    Plan:  · See A&P under \"moderate acute asthma exacerbation\"    Vitamin D deficiency  Assessment & Plan  · Known hx  · Nost recent Vitamin D level- 20 5 (10/2021)  · Managed on VitD3 supplement at home    Plan:  · Continue home dose VitD3 1000U qd          VTE Pharmacologic Prophylaxis: Enoxaparin (Lovenox)  VTE Mechanical Prophylaxis: sequential compression device    CHIEF COMPLAINT     Chief Complaint   Patient presents with   • Shortness of Breath     Pt developed SOB two days ago along with chest pain  Pt has hx of asthma & has tried nebulizer tx w/o relief  Pt says his mother mixed cleaning chemicals together which made it worse  Pt also c/o HA & being diaphoretic       HISTORY OF PRESENT ILLNESS     Alejo Negron  is a 75y o  year old male with a past medical history of obesity class 2 (BMI 36 49), primary HTN without use of antihypertensive medications and mild persistent asthma diagnosed in childhood without documented PFT on file  on albuterol rescue inhaler who presented to ED on 3/27 with shortness of breath that began two days ago with intermittent chest tightness  Attributes his symptoms to seasonal changes however also mentions that his symptoms were exacerbated after he was exposed to mixing of household cleaning chemicals at his home 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, who he takes care of  Typically has exacerbations at this time of the year treated with rescue inhaler  States goes weeks to months without using rescue inhaler  Does endorse history of previous admission requiring intubation in 2004, none since then  No ED visits for asthma exacerbations within past year  No known cardiopulmonary disease with exception of HTN  No recent sick contacts or illness  No fever or chills  ED course: On ED arrival, patient was afebrile with temp 99  5 , tachycardic 102, tachypneic 23, and hypertensive at 233/116, satting 95% on RA  Wheezing noted on exam without use of accessory muscles  Remained on RA   CBC showed " leukocytosis 14 93, BMP notable for elevated serum Cr 1 51, compared to baseline 1 0-1 2  Initial Hs Trops negative  ECG with NSR, rate 92, LAFB  CXR without focal consolidations or infiltrates on my read, official read pending  Patient subsequently treated for presumed asthma exacerbation with prednisone, mag 2g, and nebulized bronchodilators including albuterol and Atrovent minimal symptomatic improvement, requiring additional round of nebulized tx with albuterol and Atrovent  Also received 1L bolus of NS  BP on repeat checks 176/ without use of medical intervention  Patient was subsequently admitted to [facility] for further observation for acute asthma exacerbation  REVIEW OF SYSTEMS     Review of Systems  All ROS negative unless otherwise stated in the HPI    Vitals:    23 2004 23 2223 23 2320 23 0007   BP:   (!) 176/91 150/93   BP Location:   Left arm    Pulse:   99 93   Resp:   22    Temp:       TempSrc:       SpO2: 98% 97% 96% 95%   Weight:       Height:          Temperature:   Temp (24hrs), Av 5 °F (37 5 °C), Min:99 5 °F (37 5 °C), Max:99 5 °F (37 5 °C)    Temperature: 99 5 °F (37 5 °C)  Intake & Output:  I/O        0701   0700  0701   0700    IV Piggyback  1050    Total Intake(mL/kg)  1050 (9 6)    Net  +1050              Weights:   IBW (Ideal Body Weight): 68 4 kg    Body mass index is 36 49 kg/m²  Weight (last 2 days)     Date/Time Weight    23 194 109 (240)        Physical Exam  Vitals and nursing note reviewed  Constitutional:       General: He is not in acute distress  Appearance: He is well-developed  He is obese  He is not ill-appearing, toxic-appearing or diaphoretic  HENT:      Head: Normocephalic and atraumatic  Eyes:      Conjunctiva/sclera: Conjunctivae normal    Cardiovascular:      Rate and Rhythm: Normal rate and regular rhythm  Heart sounds: No murmur heard    Pulmonary:      Effort: Pulmonary effort is normal  No respiratory distress  Breath sounds: Wheezing (audible) present  Comments: Mild conversational dyspnea, able to speak in phrases  Sitting up, but not in tripod position  No use of accessory muscles or other signs of increased WOB    Abdominal:      Palpations: Abdomen is soft  Tenderness: There is no abdominal tenderness  Musculoskeletal:         General: No swelling  Cervical back: Neck supple  Right lower leg: No edema  Left lower leg: No edema  Skin:     General: Skin is warm and dry  Capillary Refill: Capillary refill takes less than 2 seconds  Neurological:      Mental Status: He is alert  Psychiatric:         Mood and Affect: Mood normal        PAST MEDICAL HISTORY     Past Medical History:   Diagnosis Date   • Asthma    • Need for prophylactic vaccination and inoculation against influenza    • Right inguinal hernia 9/19/2014     PAST SURGICAL HISTORY     Past Surgical History:   Procedure Laterality Date   • HERNIA REPAIR Left     with mesh   • MA RPR 1ST INGUN HRNA AGE 4 YRS/> REDUCIBLE Right 4/3/2018    Procedure: REPAIR HERNIA INGUINAL;  Surgeon: Chacorta Whatley MD;  Location: [facility];  Service: General     SOCIAL & FAMILY HISTORY     Social History     Substance and Sexual Activity   Alcohol Use Yes    Comment: occassionally     Substance and Sexual Activity   Alcohol Use Yes    Comment: occassionally        Substance and Sexual Activity   Drug Use No     Social History     Tobacco Use   Smoking Status Never   Smokeless Tobacco Never     Family History   Problem Relation Age of Onset   • No Known Problems Family    • Breast cancer Maternal Aunt      LABORATORY DATA     Labs: I have personally reviewed pertinent reports      Results from last 7 days   Lab Units 03/28/23 2000   WBC Thousand/uL 14 93*   HEMOGLOBIN g/dL 15 1   HEMATOCRIT % 45 5   PLATELETS Thousands/uL 345   NEUTROS PCT % 77*   MONOS PCT % 7      Results from last 7 days   Lab Units 03/28/23 2000 POTASSIUM mmol/L 3 6   CHLORIDE mmol/L 111*   CO2 mmol/L 29   BUN mg/dL 25   CREATININE mg/dL 1 51*   CALCIUM mg/dL 9 0                          Micro:  No results found for: BLOODCX, URINECX, WOUNDCULT, SPUTUMCULTUR  IMAGING & DIAGNOSTIC TESTS     Imaging: I have personally reviewed pertinent reports  No results found  EKG, Pathology, and Other Studies: I have personally reviewed pertinent reports  ALLERGIES   No Known Allergies  MEDICATIONS PRIOR TO ARRIVAL     Prior to Admission medications    Medication Sig Start Date End Date Taking?  Authorizing Provider   albuterol (PROVENTIL HFA,VENTOLIN HFA) 90 mcg/act inhaler Inhale 2 puffs every 4 (four) hours as needed for wheezing 10/3/17   Ciro Mckeon MD   Androderm 4 MG/24HR PT24 APPLY 1 PATCH EVERY DAY 6/22/22   Se Garcia MD   cholecalciferol (VITAMIN D3) 1,000 units tablet Take 1 tablet (1,000 Units total) by mouth daily 2/28/19   Janeen Pace MD     MEDICATIONS ADMINISTERED IN LAST 24 HOURS     Medication Administration - last 24 hours from 03/28/2023 0156 to 03/29/2023 0156       Date/Time Order Dose Route Action Action by     03/28/2023 2002 EDT albuterol inhalation solution 10 mg 10 mg Nebulization Given [place] Sydney Lincoln, RT     03/28/2023 2002 EDT ipratropium (ATROVENT) 0 02 % inhalation solution 1 mg 1 mg Nebulization Given [place] Sydney Berna, RT     03/28/2023 2002 EDT sodium chloride 0 9 % inhalation solution 3 mL 3 mL Nebulization Given [place] Sydney Berna, RT     03/28/2023 2114 EDT magnesium sulfate 2 g/50 mL IVPB (premix) 2 g 0 g Intravenous Stopped [facility]     03/28/2023 2002 EDT magnesium sulfate 2 g/50 mL IVPB (premix) 2 g 2 g Intravenous New Bag [facility]     03/28/2023 1959 EDT predniSONE tablet 50 mg 50 mg Oral Given [facility]     03/28/2023 2323 EDT sodium chloride 0 9 % bolus 1,000 mL 0 mL Intravenous [facility]     03/28/2023 2118 EDT sodium chloride 0 9 % bolus 1,000 mL 1,000 mL Intravenous New Bag "Heddie Aase     03/28/2023 2220 EDT albuterol inhalation solution 10 mg 10 mg Nebulization Given Clementina Karst, RT     03/28/2023 2220 EDT ipratropium (ATROVENT) 0 02 % inhalation solution 1 mg 1 mg Nebulization Given Clementina Karst, RT     03/28/2023 2220 EDT sodium chloride 0 9 % inhalation solution 3 mL 3 mL Nebulization Given Clementina Karst, RT     03/29/2023 0038 EDT sodium chloride 0 9 % infusion 100 mL/hr Intravenous [facility] Rachel Cantu RN     03/29/2023 0149 EDT ipratropium-albuterol (DUO-NEB) 0 5-2 5 mg/3 mL inhalation solution 3 mL 3 mL Nebulization Given Claudiagurmeet Larsen, RT        CURRENT MEDICATIONS     multi-electrolyte, 125 mL/hr          Admission Time  I spent 30 minutes admitting the patient  This involved direct patient contact where I performed a full history and physical, reviewing previous records, and reviewing laboratory and other diagnostic studies  Portions of the record may have been created with voice recognition software  Occasional wrong word or \"sound a like\" substitutions may have occurred due to the inherent limitations of voice recognition software    Read the chart carefully and recognize, using context, where substitutions have occurred     ==  Gustavo Gilliam DO, [address] [facility]  Internal Medicine Residency PGY-1    "

## 2023-03-30 ENCOUNTER — TRANSITIONAL CARE MANAGEMENT (OUTPATIENT)
Dept: INTERNAL MEDICINE CLINIC | Facility: CLINIC | Age: 68
End: 2023-03-30

## 2023-04-04 NOTE — ED PROVIDER NOTES
History  Chief Complaint   Patient presents with   • Shortness of Breath     Pt developed SOB two days ago along with chest pain  Pt has hx of asthma & has tried nebulizer tx w/o relief  Pt says his mother mixed cleaning chemicals together which made it worse  Pt also c/o HA & being diaphoretic      HPI    , Past medical history significant for asthma, presenting for evaluation of 2 days of progressively worsening shortness of breath, chest tightness  Patient has been using his inhaler without relief of his symptoms  Patient believes that his symptoms were brought on by cleaning chemicals that his mother is using  Symptoms are worsened by exertion and mildly improved with rest   Is associated with a cough  Denies fever, chills, chest pain, abdominal pain, nausea, vomiting  Does not believe he has ever required hospitalization for an asthma exacerbation  Prior to Admission Medications   Prescriptions Last Dose Informant Patient Reported? Taking?    Androderm 4 MG/24HR PT24   No No   Sig: APPLY 1 PATCH EVERY DAY   albuterol (PROVENTIL HFA,VENTOLIN HFA) 90 mcg/act inhaler   No No   Sig: Inhale 2 puffs every 4 (four) hours as needed for wheezing   cholecalciferol (VITAMIN D3) 1,000 units tablet   No No   Sig: Take 1 tablet (1,000 Units total) by mouth daily      Facility-Administered Medications: None       Past Medical History:   Diagnosis Date   • Asthma    • Need for prophylactic vaccination and inoculation against influenza    • Right inguinal hernia 9/19/2014       Past Surgical History:   Procedure Laterality Date   • HERNIA REPAIR Left     with mesh   • FL RPR 1ST INGUN HRNA AGE 4 YRS/> REDUCIBLE Right 4/3/2018    Procedure: REPAIR HERNIA INGUINAL;  Surgeon: Mirta Perez MD;  Location: [facility];  Service: General       Family History   Problem Relation Age of Onset   • No Known Problems Family    • Breast cancer Maternal Aunt      I have reviewed and agree with the history as documented  E-Cigarette/Vaping   • E-Cigarette Use Never User      E-Cigarette/Vaping Substances   • Nicotine No    • THC No    • CBD No    • Flavoring No    • Other No    • Unknown No      Social History     Tobacco Use   • Smoking status: Never   • Smokeless tobacco: Never   Vaping Use   • Vaping Use: Never used   Substance Use Topics   • Alcohol use: Yes     Comment: occassionally   • Drug use: No        Review of Systems   Constitutional: Negative for chills and fever  HENT: Negative for sore throat  Respiratory: Positive for cough, chest tightness, shortness of breath and wheezing  Cardiovascular: Negative for chest pain, palpitations and leg swelling  Gastrointestinal: Negative for abdominal pain, diarrhea, nausea and vomiting  Genitourinary: Negative for dysuria and frequency  Musculoskeletal: Negative for myalgias  Skin: Negative for rash and wound  Neurological: Negative for dizziness, weakness, light-headedness, numbness and headaches  All other systems reviewed and are negative  Physical Exam  ED Triage Vitals [03/28/23 1944]   Temperature Pulse Respirations Blood Pressure SpO2   99 5 °F (37 5 °C) 102 (!) 23 (!) 233/116 95 %      Temp Source Heart Rate Source Patient Position - Orthostatic VS BP Location FiO2 (%)   Oral Monitor Sitting Left arm --      Pain Score       4             Orthostatic Vital Signs  Vitals:    03/29/23 0007 03/29/23 0230 03/29/23 0729 03/29/23 0822   BP: 150/93 149/95 164/99 169/98   Pulse: 93 99 88 94   Patient Position - Orthostatic VS:   Lying Lying       Physical Exam  Vitals and nursing note reviewed  Constitutional:       General: He is in acute distress  Appearance: Normal appearance  He is diaphoretic  He is not ill-appearing or toxic-appearing  HENT:      Head: Normocephalic and atraumatic        Right Ear: External ear normal       Left Ear: External ear normal       Nose: Nose normal       Mouth/Throat:      Mouth: Mucous membranes are moist       Pharynx: Oropharynx is clear  Eyes:      General: No scleral icterus  Extraocular Movements: Extraocular movements intact  Cardiovascular:      Rate and Rhythm: Normal rate and regular rhythm  Pulses: Normal pulses  Pulmonary:      Effort: Pulmonary effort is normal  Tachypnea present  No respiratory distress  Breath sounds: Wheezing present  Abdominal:      Palpations: Abdomen is soft  Tenderness: There is no abdominal tenderness  Musculoskeletal:         General: Normal range of motion  Cervical back: Normal range of motion and neck supple  Skin:     General: Skin is warm  Capillary Refill: Capillary refill takes less than 2 seconds  Neurological:      General: No focal deficit present  Mental Status: He is alert and oriented to person, place, and time           ED Medications  Medications   albuterol inhalation solution 10 mg (10 mg Nebulization Given 3/28/23 2002)     And   ipratropium (ATROVENT) 0 02 % inhalation solution 1 mg (1 mg Nebulization Given 3/28/23 2002)     And   sodium chloride 0 9 % inhalation solution 3 mL (3 mL Nebulization Given 3/28/23 2002)   magnesium sulfate 2 g/50 mL IVPB (premix) 2 g (0 g Intravenous Stopped 3/28/23 2114)   predniSONE tablet 50 mg (50 mg Oral Given 3/28/23 1959)   sodium chloride 0 9 % bolus 1,000 mL (0 mL Intravenous Stopped 3/28/23 2323)   albuterol inhalation solution 10 mg (10 mg Nebulization Given 3/28/23 2220)     And   ipratropium (ATROVENT) 0 02 % inhalation solution 1 mg (1 mg Nebulization Given 3/28/23 2220)     And   sodium chloride 0 9 % inhalation solution 3 mL (3 mL Nebulization Given 3/28/23 2220)   acetaminophen (TYLENOL) tablet 650 mg (650 mg Oral Given 3/29/23 0227)       Diagnostic Studies  Results Reviewed     Procedure Component Value Units Date/Time    HS Troponin 0hr (reflex protocol) [[id number]]  (Normal) Collected: 03/28/23 2000    Lab Status: Final result Specimen: Blood from Arm, Right Updated: 03/28/23 2043     hs TnI 0hr 4 ng/L     Basic metabolic panel [[id number]]  (Abnormal) Collected: 03/28/23 2000    Lab Status: Final result Specimen: Blood from Arm, Right Updated: 03/28/23 2033     Sodium 141 mmol/L      Potassium 3 6 mmol/L      Chloride 111 mmol/L      CO2 29 mmol/L      ANION GAP 1 mmol/L      BUN 25 mg/dL      Creatinine 1 51 mg/dL      Glucose 93 mg/dL      Calcium 9 0 mg/dL      eGFR 46 ml/min/1 73sq m     Narrative:      [place] guidelines for Chronic Kidney Disease (CKD):   •  Stage 1 with normal or high GFR (GFR > 90 mL/min/1 73 square meters)  •  Stage 2 Mild CKD (GFR = 60-89 mL/min/1 73 square meters)  •  Stage 3A Moderate CKD (GFR = 45-59 mL/min/1 73 square meters)  •  Stage 3B Moderate CKD (GFR = 30-44 mL/min/1 73 square meters)  •  Stage 4 Severe CKD (GFR = 15-29 mL/min/1 73 square meters)  •  Stage 5 End Stage CKD (GFR <15 mL/min/1 73 square meters)  Note: GFR calculation is accurate only with a steady state creatinine    CBC and differential [[id number]]  (Abnormal) Collected: 03/28/23 2000    Lab Status: Final result Specimen: Blood from Arm, Right Updated: 03/28/23 2009     WBC 14 93 Thousand/uL      RBC 4 91 Million/uL      Hemoglobin 15 1 g/dL      Hematocrit 45 5 %      MCV 93 fL      MCH 30 8 pg      MCHC 33 2 g/dL      RDW 14 4 %      MPV 9 6 fL      Platelets 847 Thousands/uL      nRBC 0 /100 WBCs      Neutrophils Relative 77 %      Immat GRANS % 1 %      Lymphocytes Relative 12 %      Monocytes Relative 7 %      Eosinophils Relative 2 %      Basophils Relative 1 %      Neutrophils Absolute 11 65 Thousands/µL      Immature Grans Absolute 0 07 Thousand/uL      Lymphocytes Absolute 1 79 Thousands/µL      Monocytes Absolute 1 04 Thousand/µL      Eosinophils Absolute 0 31 Thousand/µL      Basophils Absolute 0 07 Thousands/µL                  XR chest 2 views   ED Interpretation by Neida Godinez DO (03/28 2112)   No consolidation, no ptx        Final Result by Gerri Day MD (03/29 1113)   No acute cardiopulmonary findings  Workstation performed: [id number]               Procedures  Procedures      ED Course  ED Course as of 04/04/23 1317   Tue Mar 28, 2023   2201 Creatinine(!): 1 51  1L IVF ordered             HEART Risk Score    Flowsheet Row Most Recent Value   Heart Score Risk Calculator    History 0 Filed at: 03/28/2023 1954   ECG 1 Filed at: 03/28/2023 1954   Age 2 Filed at: 03/28/2023 1954   Risk Factors 1 Filed at: 03/28/2023 1954   Troponin 0 Filed at: 03/28/2023 1954   HEART Score 4 Filed at: 03/28/2023 1954                   Initial Sepsis Screening     Row Name 03/29/23 1058                Is the patient's history suggestive of a new or worsening infection? No  -SB              User Key  (r) = Recorded By, (t) = Taken By, (c) = Cosigned By    [address] Name Provider Naresh Gonzalez MD Resident                          Medical Decision Making  50-year-old male, past medical history significant for asthma, presenting for evaluation of 2 days of worsening shortness of breath, wheezing, chest tightness that is unrelieved with his home inhaler  On exam the patient is tachypneic, has increased work of breathing, diffuse wheezing on lung auscultation  Patient was immediately started on a heart neb, given 2 g of magnesium IV, given p o  steroids for presumed asthma exacerbation  I suspect that the patient is having an asthma exacerbation, possibly exacerbated by a viral illness, chemical exposure  Given patient's age and other risk factors, will also assess for ACS, pneumonia, pneumothorax  Doubt pulmonary embolism  I personally interpreted and reviewed the patient's imaging, EKG, lab work, significant for MORENITA  Patient was given 1 L of normal saline  Patient was reassessed multiple times throughout his ED stay, repeat nebulizers were ordered as needed    Despite multiple nebulizer treatment, patient is still complaining of some wheezing and chest tightness, shortness of breath  Had a thorough discussion with the patient, recommended admission for further evaluation and treatment which the patient agrees to  Case was discussed with internal medicine, internal medicine to admit the patient for an asthma exacerbation  Amount and/or Complexity of Data Reviewed  Labs: ordered  Decision-making details documented in ED Course  Radiology: ordered and independent interpretation performed  Risk  Prescription drug management  Decision regarding hospitalization  Disposition  Final diagnoses:   Asthma exacerbation   [facility]     Time reflects when diagnosis was documented in both MDM as applicable and the Disposition within this note     Time User Action Codes Description Comment    3/28/2023 11:24 PM [facility] Add [D39 330] Asthma exacerbation     3/28/2023 11:24 PM [facility] Add [N17 9] MORENITA (acute kidney injury) ([facility] )     3/29/2023 10:57 AM Popeye Aas Add [I10] Primary hypertension     3/29/2023 10:57 AM Popeye Aas Add [J45 901] Moderate acute exacerbation of asthma     3/29/2023 10:57 AM Popeye Aas Add [J45 21] Mild intermittent extrinsic asthma with acute exacerbation       ED Disposition     ED Disposition   Admit    Condition   Stable    Date/Time   Tue Mar 28, 2023 11:24 PM    Comment   Case was discussed with Dr Cathleen Huang and the patient's admission status was agreed to be Admission Status: observation status to the service of Dr Marvel Ocasio              Follow-up Information     Follow up With Specialties Details Why [address] [facility] Schedule an appointment as soon as possible for a visit in 2 week(s) Please follow up with clinic 1-2 wks [address] [facility]  [address] [facility] [address]  [facility] [address], [address] [place], [place], [address]   [phone]          Discharge Medication List as of 3/29/2023 12:16 PM      START taking these medications    Details   amLODIPine (NORVASC) 5 mg tablet Take 1 tablet (5 mg total) by mouth daily Do not start before March 30, 2023 , Starting Thu 3/30/2023, Normal      montelukast (SINGULAIR) 10 mg tablet Take 1 tablet (10 mg total) by mouth daily at bedtime Take for 4-6 weeks now  In the future, please take 2 weeks prior and continue 2-6 weeks after resolution of seasonal symptoms, Starting Wed 3/29/2023, Normal      predniSONE 10 mg tablet Multiple Dosages:Starting Thu 3/30/2023, Until Fri 3/31/2023 at 2359, THEN Starting Sat 4/1/2023, Until Sun 4/2/2023 at 2359, THEN Starting Mon 4/3/2023, Until Tue 4/4/2023 at 2359, THEN Starting Wed 4/5/2023, Until Thu 4/6/2023 at 2359Take 4 tablet s (40 mg total) by mouth daily for 2 days, THEN 3 tablets (30 mg total) daily for 2 days, THEN 2 tablets (20 mg total) daily for 2 days, THEN 1 tablet (10 mg total) daily for 2 days  Do not start before March 30, 2023 , Normal         CONTINUE these medications which have CHANGED    Details   albuterol (PROVENTIL HFA,VENTOLIN HFA) 90 mcg/act inhaler Inhale 2 puffs every 4 (four) hours as needed for wheezing or shortness of breath, Starting Wed 3/29/2023, Normal         CONTINUE these medications which have NOT CHANGED    Details   Androderm 4 MG/24HR PT24 APPLY 1 PATCH EVERY DAY, Normal      cholecalciferol (VITAMIN D3) 1,000 units tablet Take 1 tablet (1,000 Units total) by mouth daily, Starting Thu 2/28/2019, Normal           No discharge procedures on file  PDMP Review       Value Time User    PDMP Reviewed  Yes 10/11/2021  9:57 AM Mat Marte MD           ED Provider  Attending physically available and evaluated Randy Vera    IZABELLA managed the patient along with the ED Attending      Electronically Signed by         Medhi Woo DO  04/04/23 8366

## 2023-05-18 ENCOUNTER — TELEPHONE (OUTPATIENT)
Dept: INTERNAL MEDICINE CLINIC | Facility: CLINIC | Age: 68
End: 2023-05-18

## 2023-05-18 NOTE — TELEPHONE ENCOUNTER
Fadi Garcia from [facility] office called to advise us that patient was found  in the home  There was nothing suspicious noted around patient  Patient had a history of HTN, asthma, sleep apnea, depression, obesity  They were calling to see if we would be willing to sign the death certificate  Spoke to attending, Dr Hernán Ochoa who stated he would complete certificate  Raul Granado called and advised central scheduling to watch for paperwork and then will place in solarity  Once paperwork is received, please add to this task

## 2023-05-19 ENCOUNTER — PATIENT OUTREACH (OUTPATIENT)
Dept: INTERNAL MEDICINE CLINIC | Facility: CLINIC | Age: 68
End: 2023-05-19

## 2023-05-19 NOTE — PROGRESS NOTES
JORGE returned [facility] [phone]  She is f/u on report of need for pt's Mother   Pt had been her Primary Care Giver  JORGE has checked to see if we had any additional Family contact but JORGE has not seen any

## 2023-05-22 NOTE — TELEPHONE ENCOUNTER
Folder Color- Red    Name of Form- Medical Certification    Form to be filled out by- Dr Jacqueline Carrillo    Form to be Faxed [phone]    Patient made aware of 10 business day policy

## (undated) DEVICE — GAUZE SPONGES,16 PLY: Brand: CURITY

## (undated) DEVICE — SUT VICRYL 2-0 SH 27 IN UNDYED J417H

## (undated) DEVICE — MEDI-VAC YANK SUCT HNDL W/TPRD BULBOUS TIP: Brand: CARDINAL HEALTH

## (undated) DEVICE — PROXIMATE PLUS MD MULTI-DIRECTIONAL RELEASE SKIN STAPLERS CONTAINS 35 STAINLESS STEEL STAPLES APPROXIMATE CLOSED DIMENSIONS: 6.9MM X 3.9MM WIDE: Brand: PROXIMATE

## (undated) DEVICE — SUT VICRYL 3-0 SH 27 IN J416H

## (undated) DEVICE — SPONGE CHERRY 1/2IN

## (undated) DEVICE — GLOVE SRG BIOGEL ORTHOPEDIC 7.5

## (undated) DEVICE — CHLORAPREP HI-LITE 26ML ORANGE

## (undated) DEVICE — NEEDLE 25G X 1 1/2

## (undated) DEVICE — PLUMEPEN PRO 10FT

## (undated) DEVICE — ADHESIVE SKN CLSR HISTOACRYL FLEX 0.5ML LF

## (undated) DEVICE — SUT PROLENE 2-0 CT-2 30 IN 8411H

## (undated) DEVICE — SUT PROLENE 2-0 RB-1/RB-1 36 IN 8559H

## (undated) DEVICE — INTENDED FOR TISSUE SEPARATION, AND OTHER PROCEDURES THAT REQUIRE A SHARP SURGICAL BLADE TO PUNCTURE OR CUT.: Brand: BARD-PARKER SAFETY BLADES SIZE 15, STERILE

## (undated) DEVICE — PENROSE DRAIN, 18 X 3 8: Brand: CARDINAL HEALTH

## (undated) DEVICE — SUT VICRYL 2-0 REEL 54 IN J286G

## (undated) DEVICE — STRL UNIVERSAL MINOR GENERAL: Brand: CARDINAL HEALTH